# Patient Record
Sex: MALE | Race: BLACK OR AFRICAN AMERICAN | NOT HISPANIC OR LATINO | Employment: FULL TIME | URBAN - METROPOLITAN AREA
[De-identification: names, ages, dates, MRNs, and addresses within clinical notes are randomized per-mention and may not be internally consistent; named-entity substitution may affect disease eponyms.]

---

## 2019-06-06 ENCOUNTER — OFFICE VISIT (OUTPATIENT)
Dept: URGENT CARE | Facility: URGENT CARE | Age: 19
End: 2019-06-06
Payer: COMMERCIAL

## 2019-06-06 VITALS
OXYGEN SATURATION: 99 % | WEIGHT: 170.8 LBS | HEART RATE: 58 BPM | DIASTOLIC BLOOD PRESSURE: 78 MMHG | SYSTOLIC BLOOD PRESSURE: 134 MMHG | TEMPERATURE: 98.1 F

## 2019-06-06 DIAGNOSIS — K42.9 UMBILICAL HERNIA WITHOUT OBSTRUCTION AND WITHOUT GANGRENE: Primary | ICD-10-CM

## 2019-06-06 PROCEDURE — 99203 OFFICE O/P NEW LOW 30 MIN: CPT | Performed by: PHYSICIAN ASSISTANT

## 2019-06-06 ASSESSMENT — ENCOUNTER SYMPTOMS
ABDOMINAL PAIN: 1
SHORTNESS OF BREATH: 0
CHILLS: 0
CARDIOVASCULAR NEGATIVE: 1
DIZZINESS: 0
VOMITING: 0
MUSCULOSKELETAL NEGATIVE: 1
CONSTITUTIONAL NEGATIVE: 1
EYE REDNESS: 0
WEAKNESS: 0
WHEEZING: 0
EYE ITCHING: 0
FREQUENCY: 0
EYES NEGATIVE: 1
ENDOCRINE NEGATIVE: 1
HEMATURIA: 0
NAUSEA: 0
RESPIRATORY NEGATIVE: 1
CHEST TIGHTNESS: 0
PALPITATIONS: 0
SORE THROAT: 0
NEUROLOGICAL NEGATIVE: 1
ADENOPATHY: 0
HEADACHES: 0
POLYDIPSIA: 0
DYSURIA: 0
DIAPHORESIS: 0
MYALGIAS: 0
COUGH: 0
LIGHT-HEADEDNESS: 0
DIARRHEA: 0
FEVER: 0
RHINORRHEA: 0
EYE DISCHARGE: 0

## 2019-06-06 NOTE — PROGRESS NOTES
Chief Complaint:    Chief Complaint   Patient presents with     Abdominal Pain     Patient complains of abdominal pain x 3 weeks        HPI: Radha Garcia is an 18 year old male who presents for evaluation and treatment of abdominal pain.  Patient states that his symptoms started roughly 3 weeks ago.  His symptoms are around the umbilicus.  The pain is sharp in nature and does not radiate.  Movement seems to make the symptoms worse at times.  His symptoms come and go.  He states that he did have this when he was a child, and it resolved on it's own.  He denies any constipation or diarrhea.  He has been having normal bowel movements.  No nausea or vomiting.  No black tarry stools.    Patient is new to [place].    ROS:      Review of Systems   Constitutional: Negative.  Negative for chills, diaphoresis and fever.   HENT: Negative.  Negative for congestion, ear pain, rhinorrhea and sore throat.    Eyes: Negative.  Negative for discharge, redness and itching.   Respiratory: Negative.  Negative for cough, chest tightness, shortness of breath and wheezing.    Cardiovascular: Negative.  Negative for chest pain and palpitations.   Gastrointestinal: Positive for abdominal pain. Negative for diarrhea, nausea and vomiting.   Endocrine: Negative.  Negative for polydipsia and polyuria.   Genitourinary: Negative for dysuria, frequency, hematuria and urgency.   Musculoskeletal: Negative.  Negative for myalgias.   Skin: Negative for rash.   Allergic/Immunologic: Negative for immunocompromised state.   Neurological: Negative.  Negative for dizziness, weakness, light-headedness and headaches.   Hematological: Negative for adenopathy.        No pertinent family or medical Hx at this time.  Patient has never smoked.  No pertinent surgical Hx at this time.    Family History   No family history on file.    Social History  Social History     Socioeconomic History     Marital status: Single     Spouse name: Not on file     Number of  children: Not on file     Years of education: Not on file     Highest education level: Not on file   Occupational History     Not on file   Social Needs     Financial resource strain: Not on file     Food insecurity:     Worry: Not on file     Inability: Not on file     Transportation needs:     Medical: Not on file     Non-medical: Not on file   Tobacco Use     Smoking status: Never Smoker     Smokeless tobacco: Never Used   Substance and Sexual Activity     Alcohol use: Not on file     Drug use: Not on file     Sexual activity: Not on file   Lifestyle     Physical activity:     Days per week: Not on file     Minutes per session: Not on file     Stress: Not on file   Relationships     Social connections:     Talks on phone: Not on file     Gets together: Not on file     Attends Mandaeism service: Not on file     Active member of club or organization: Not on file     Attends meetings of clubs or organizations: Not on file     Relationship status: Not on file     Intimate partner violence:     Fear of current or ex partner: Not on file     Emotionally abused: Not on file     Physically abused: Not on file     Forced sexual activity: Not on file   Other Topics Concern     Not on file   Social History Narrative     Not on file        Surgical History:  No past surgical history on file.     Problem List:  There is no problem list on file for this patient.       Allergies:  No Known Allergies     Current Meds:  No current outpatient medications on file.     PHYSICAL EXAM:     Vital signs noted and reviewed by Lico Dooley  /78 (BP Location: Left arm, Patient Position: Chair, Cuff Size: Adult Regular)   Pulse 58   Temp 98.1  F (36.7  C) (Oral)   Wt 77.5 kg (170 lb 12.8 oz)   SpO2 99%      PEFR:    Physical Exam   Constitutional: He appears well-developed and well-nourished. He is cooperative.  Non-toxic appearance. He does not have a sickly appearance. He does not appear ill. No distress.   HENT:   Head:  Normocephalic and atraumatic.   Right Ear: Hearing, tympanic membrane, external ear and ear canal normal. Tympanic membrane is not perforated, not erythematous, not retracted and not bulging.   Left Ear: Hearing, tympanic membrane, external ear and ear canal normal. Tympanic membrane is not perforated, not erythematous, not retracted and not bulging.   Nose: Nose normal. No mucosal edema or rhinorrhea.   Mouth/Throat: Oropharynx is clear and moist and mucous membranes are normal. No oropharyngeal exudate, posterior oropharyngeal edema, posterior oropharyngeal erythema or tonsillar abscesses. Tonsils are 0 on the right. Tonsils are 0 on the left. No tonsillar exudate.   Eyes: Pupils are equal, round, and reactive to light. EOM are normal.   Neck: Normal range of motion. Neck supple.   Cardiovascular: Normal rate, regular rhythm, S1 normal, S2 normal, normal heart sounds and intact distal pulses. Exam reveals no gallop, no distant heart sounds and no friction rub.   No murmur heard.  Pulmonary/Chest: Effort normal and breath sounds normal. No respiratory distress. He has no decreased breath sounds. He has no wheezes. He has no rhonchi. He has no rales.   Abdominal: Soft. Bowel sounds are normal. He exhibits no distension. There is tenderness in the periumbilical area. There is no rigidity, no rebound, no guarding, no CVA tenderness, no tenderness at McBurney's point and negative Santana's sign. A hernia is present. Hernia confirmed positive in the ventral area.   Small hernia around umbilicus   Lymphadenopathy:     He has no cervical adenopathy.   Neurological: He is alert. No cranial nerve deficit.   Skin: Skin is warm and dry. No rash noted. He is not diaphoretic.   Psychiatric: He has a normal mood and affect. His speech is normal and behavior is normal. Judgment and thought content normal. Cognition and memory are normal. He is attentive.   Nursing note and vitals reviewed.       Labs:     No results found for this  or any previous visit.    Medical Decision Making:    Differential Diagnosis:  hernia     ASSESSMENT:     1. Umbilical hernia without obstruction and without gangrene         PLAN:     Patient is currently asymptomatic.  No indication of strangulation.  Emergent imaging to rule this or obstruction out is not indicated.  Order place for him to follow up with surgeon.  Worrisome symptoms discussed with instructions to go to the ED.  Patient verbalized understanding and agreed with this plan.     Lico Dooley  6/6/2019, 11:32 AM

## 2019-06-10 ENCOUNTER — ANCILLARY PROCEDURE (OUTPATIENT)
Dept: GENERAL RADIOLOGY | Facility: CLINIC | Age: 19
End: 2019-06-10
Attending: INTERNAL MEDICINE
Payer: OTHER MISCELLANEOUS

## 2019-06-10 ENCOUNTER — OFFICE VISIT (OUTPATIENT)
Dept: FAMILY MEDICINE | Facility: CLINIC | Age: 19
End: 2019-06-10
Payer: OTHER MISCELLANEOUS

## 2019-06-10 VITALS
HEIGHT: 70 IN | HEART RATE: 56 BPM | DIASTOLIC BLOOD PRESSURE: 83 MMHG | TEMPERATURE: 98.5 F | SYSTOLIC BLOOD PRESSURE: 133 MMHG | OXYGEN SATURATION: 100 % | BODY MASS INDEX: 24.91 KG/M2 | WEIGHT: 174 LBS

## 2019-06-10 DIAGNOSIS — Z02.6 ENCOUNTER RELATED TO WORKER'S COMPENSATION CLAIM: Primary | ICD-10-CM

## 2019-06-10 DIAGNOSIS — M62.838 MUSCLE SPASM: ICD-10-CM

## 2019-06-10 DIAGNOSIS — R07.81 RIB PAIN ON LEFT SIDE: ICD-10-CM

## 2019-06-10 PROBLEM — T14.8XXA MUSCLE TEAR: Status: ACTIVE | Noted: 2019-06-10

## 2019-06-10 PROCEDURE — 99214 OFFICE O/P EST MOD 30 MIN: CPT | Performed by: INTERNAL MEDICINE

## 2019-06-10 PROCEDURE — 71101 X-RAY EXAM UNILAT RIBS/CHEST: CPT | Mod: LT

## 2019-06-10 RX ORDER — METHOCARBAMOL 500 MG/1
500 TABLET, FILM COATED ORAL 4 TIMES DAILY PRN
Qty: 30 TABLET | Refills: 1 | Status: SHIPPED | OUTPATIENT
Start: 2019-06-10

## 2019-06-10 RX ORDER — IBUPROFEN 600 MG/1
600 TABLET, FILM COATED ORAL
COMMUNITY
Start: 2019-06-10

## 2019-06-10 RX ORDER — ACETAMINOPHEN 325 MG/1
650 TABLET ORAL
COMMUNITY
Start: 2019-06-10

## 2019-06-10 ASSESSMENT — MIFFLIN-ST. JEOR: SCORE: 1805.51

## 2019-06-10 ASSESSMENT — PAIN SCALES - GENERAL: PAINLEVEL: SEVERE PAIN (7)

## 2019-06-10 NOTE — PATIENT INSTRUCTIONS
================================================================================  Normal Values   Blood pressure  <140/90 for most adults    <130/80 for some chronic diseases (ask your care team about yours)    BMI (body mass index)  18.5-25 kg/m2 (based on height and weight)     Thank you for visiting [facility]    Normal or non-critical lab and imaging results will be communicated to you by MyChart, letter or phone within 7 days.  If you do not hear from us within 10 days, please call the clinic. If you have a critical or abnormal lab result, we will notify you by phone as soon as possible.     If you have any questions regarding your visit please contact:     Team Comfort:   Clinic Hours Telephone Number   Dr. Rylan Landry Dr. [place] 7am-5pm  Monday - Friday [phone]  Lorie RN  Ophelia RN  Suad RN   Pharmacy 8:00am-8pm Monday-Friday    9am-5pm Saturday-Sunday [phone]   Urgent Care 11am-9pm Monday-Friday        9am-5pm Saturday-Sunday [phone]     After hours, weekend or if you need to make an appointment with your primary provider please call [phone].   After Hours nurse advise: call [facility]: [phone]    Medication Refills:  Call your pharmacy and they will forward the refill to us. Please allow 3 business days for your refills to be completed.

## 2019-06-10 NOTE — PROGRESS NOTES
"Lois Garcia is a 18 year old male who presents to clinic today for the following health issues:    HPI   Joint Pain    Onset: 6/9/19    Description: Trying to stack two 10 pound stacks of bun.   Location: Left side ribs  Character: Dull Ache    Intensity: 7/10    Progression of Symptoms: same    Accompanying Signs & Symptoms:  Other symptoms: radiation of pain to back    History:   Previous similar pain: no       Precipitating factors:   Trauma or overuse: YES- work    Alleviating factors:Spread to low back area, but improving. When doing sit-ups, abdominal pain recurs (\"scraping pain\"). Worse when walking.  Improved by: nothing  Therapies Tried and outcome: SEEN AT [place] 6/9/19 GIVEN IBUPROFEN & TYLENOL. No tests done.          Patient Active Problem List   Diagnosis     Rib pain on left side     Muscle tear     No past surgical history on file.    Social History     Tobacco Use     Smoking status: Never Smoker     Smokeless tobacco: Never Used   Substance Use Topics     Alcohol use: Not on file     No family history on file.      No Known Allergies  No lab results found.   BP Readings from Last 3 Encounters:   06/12/19 131/80   06/10/19 133/83   06/06/19 134/78    Wt Readings from Last 3 Encounters:   06/12/19 77.6 kg (171 lb)   06/10/19 78.9 kg (174 lb)   06/06/19 77.5 kg (170 lb 12.8 oz)                      Reviewed and updated as needed this visit by Provider         Review of Systems   ROS COMP: CONSTITUTIONAL: NEGATIVE for fever, chills, change in weight  INTEGUMENTARY/SKIN: NEGATIVE for worrisome rashes, moles or lesions  EYES: NEGATIVE for vision changes or irritation  ENT/MOUTH: NEGATIVE for ear, mouth and throat problems  RESP: NEGATIVE for significant cough or SOB  CV: NEGATIVE for chest pain, palpitations or peripheral edema  GI: NEGATIVE for nausea, abdominal pain, heartburn, or change in bowel habits  : NEGATIVE for frequency, dysuria, or hematuria  MUSCULOSKELETAL: NEGATIVE " "for significant arthralgias or myalgia  NEURO: NEGATIVE for weakness, dizziness or paresthesias  ENDOCRINE: NEGATIVE for temperature intolerance, skin/hair changes  HEME: NEGATIVE for bleeding problems  PSYCHIATRIC: NEGATIVE for changes in mood or affect      Objective    /83 (BP Location: Left arm, Patient Position: Chair, Cuff Size: Adult Large)   Pulse 56   Temp 98.5  F (36.9  C) (Oral)   Ht 1.778 m (5' 10\")   Wt 78.9 kg (174 lb)   SpO2 100%   BMI 24.97 kg/m    Body mass index is 24.97 kg/m .  Physical Exam   GENERAL: healthy, alert and no distress  EYES: Eyes grossly normal to inspection and conjunctivae and sclerae normal  HENT: normal cephalic/atraumatic and oral mucous membranes moist  RESP: lungs clear to auscultation - no rales, rhonchi or wheezes  CV: regular rate and rhythm, normal S1 S2, no S3 or S4, no murmur,   ABDOMEN: soft, nontender, no hepatosplenomegaly, no masses and bowel sounds normal  MS: Tenderness on palpation of the left subcostal area and abdominal muscles at LUQ and periumbilical area.  SKIN: no suspicious lesions or rashes  NEURO: Normal strength and tone, mentation intact and speech normal  PSYCH: mentation appears normal, affect normal/bright    Diagnostic Test Results:  Results for orders placed or performed in visit on 06/10/19   XR Ribs & Chest Left G/E 3 Views    Narrative    CHEST AND LEFT RIBS THREE VIEWS   6/10/2019 1:40 PM     HISTORY: Encounter related to worker's compensation claim. Rib pain on  left side.    COMPARISON: None.      Impression    IMPRESSION: Normal.      NIDHI NUÑEZ MD           Assessment & Plan     1. Encounter related to worker's compensation claim    - XR Ribs & Chest Left G/E 3 Views  - methocarbamol (ROBAXIN) 500 MG tablet; Take 1 tablet (500 mg) by mouth 4 times daily as needed for muscle spasms  Dispense: 30 tablet; Refill: 1  - CT Abdomen Pelvis w/o Contrast; Future    2. Rib pain on left side    - acetaminophen (TYLENOL) 325 MG tablet; " Take 650 mg by mouth  - ibuprofen (ADVIL/MOTRIN) 600 MG tablet; Take 600 mg by mouth  - XR Ribs & Chest Left G/E 3 Views  - methocarbamol (ROBAXIN) 500 MG tablet; Take 1 tablet (500 mg) by mouth 4 times daily as needed for muscle spasms  Dispense: 30 tablet; Refill: 1  - CT Abdomen Pelvis w/o Contrast; Future    3. Muscle spasm    - methocarbamol (ROBAXIN) 500 MG tablet; Take 1 tablet (500 mg) by mouth 4 times daily as needed for muscle spasms  Dispense: 30 tablet; Refill: 1       Return in 2 days (on 6/12/2019).    Ramirez Obrien MD  [facility]

## 2019-06-10 NOTE — LETTER
REPORT OF WORK ABILITY    [address] [address]  [phone]      PATIENT DATA    Employee Name: Radha Garcia      : 2000     #: [id number]    Today's date: 6/10/2019  Date of injury: 6/10/2019  Date of first visit: 6/10/2019    PROVIDER EVALUATION: Please fill in as needed.  Please give copy to employee for employer.    1. Diagnosis: Left rib pain, subsequent encounter                        Periumbilical pain, suspect abdominal wall muscle tear.    2. Treatment: Rest and pharmacotherapy.  3. Medication: Methocarbamol, Ibuprofen and Tylenol.  NOTE: When ordering a medication, [place] Rules require Work Comp or WC on prescriptions.    4. No work from 6/10/2019 to 2019.  5. Return to work date: 2019, tentative date.   ** WITH RESTRICTIONS? No restrictions, but it will be updated during his follow-up appointment.      RESTRICTIONS: Unlimited unless listed.  Restrictions apply to home and leisure also.  If work restrictions is not available, the employee is totally disabled.    Maximum Medical Improvement (Date): Cannot be determined yet  Any Permanent Partial Disability? 0%    Provider comments: Mr. Garcia requires rib x-rays due to persistent left subcostal pain and CT of abdomen due to suspected abdominal muscle tear.    Medical Examiner: Ramirez Obrien MD          License or registration: [id number]    Next appointment: 2019.    CC: Employer, Managed Care Plan/Payor, Patient

## 2019-06-11 ENCOUNTER — ANCILLARY PROCEDURE (OUTPATIENT)
Dept: CT IMAGING | Facility: CLINIC | Age: 19
End: 2019-06-11
Attending: INTERNAL MEDICINE
Payer: OTHER MISCELLANEOUS

## 2019-06-11 DIAGNOSIS — T14.8XXA MUSCLE TEAR: ICD-10-CM

## 2019-06-11 DIAGNOSIS — Z02.6 ENCOUNTER RELATED TO WORKER'S COMPENSATION CLAIM: ICD-10-CM

## 2019-06-11 PROCEDURE — 74176 CT ABD & PELVIS W/O CONTRAST: CPT | Performed by: RADIOLOGY

## 2019-06-12 ENCOUNTER — TELEPHONE (OUTPATIENT)
Dept: FAMILY MEDICINE | Facility: CLINIC | Age: 19
End: 2019-06-12

## 2019-06-12 ENCOUNTER — OFFICE VISIT (OUTPATIENT)
Dept: FAMILY MEDICINE | Facility: CLINIC | Age: 19
End: 2019-06-12
Payer: OTHER MISCELLANEOUS

## 2019-06-12 VITALS
DIASTOLIC BLOOD PRESSURE: 80 MMHG | WEIGHT: 171 LBS | RESPIRATION RATE: 16 BRPM | HEIGHT: 70 IN | HEART RATE: 63 BPM | BODY MASS INDEX: 24.48 KG/M2 | TEMPERATURE: 98.5 F | SYSTOLIC BLOOD PRESSURE: 131 MMHG | OXYGEN SATURATION: 100 %

## 2019-06-12 DIAGNOSIS — R07.81 RIB PAIN ON LEFT SIDE: ICD-10-CM

## 2019-06-12 DIAGNOSIS — S39.011S: ICD-10-CM

## 2019-06-12 DIAGNOSIS — Z02.6 ENCOUNTER RELATED TO WORKER'S COMPENSATION CLAIM: Primary | ICD-10-CM

## 2019-06-12 PROCEDURE — 99213 OFFICE O/P EST LOW 20 MIN: CPT | Performed by: INTERNAL MEDICINE

## 2019-06-12 ASSESSMENT — MIFFLIN-ST. JEOR: SCORE: 1791.9

## 2019-06-12 ASSESSMENT — PAIN SCALES - GENERAL: PAINLEVEL: MODERATE PAIN (4)

## 2019-06-12 NOTE — TELEPHONE ENCOUNTER
----- Message from Ramirez Obrien MD sent at 6/11/2019  9:19 AM CDT -----  Despite left subcostal pain sustained at work, x-rays show no evidence of rib fractures.  Continue Ibuprofen and Methocarbamol.    (Call patient)      This writer attempted to contact patient on 06/12/19      Reason for call result notes and left message.      If patient calls back:   Patient contacted by 1st floor [place] Care Team (MA/TC). Inform patient that someone from the team will contact them, document that pt called and route to care team.         Tena Ceron MA

## 2019-06-12 NOTE — PROGRESS NOTES
Subjective     Radha Garcia is a 18 year old male who presents to clinic today for the following health issues:    HPI   Worker's compensation follow-up  Onset: 06/10/2019    Description:   Patient had a work related injury to left side of rib with radiation of pain to back.     Intensity: 4/10    Progression of Symptoms:  improving    Accompanying Signs & Symptoms:  Radiation to back     Previous history of similar problem:   no    Precipitating factors:   Worsened by: walking     Alleviating factors:  Improved by: ibuprofen and Tylenol    Therapies Tried and outcome: ibuprofen and methocarbamol         Patient Active Problem List   Diagnosis     Rib pain on left side     Muscle tear     History reviewed. No pertinent surgical history.    Social History     Tobacco Use     Smoking status: Never Smoker     Smokeless tobacco: Never Used   Substance Use Topics     Alcohol use: Not on file     History reviewed. No pertinent family history.      Current Outpatient Medications   Medication Sig Dispense Refill     acetaminophen (TYLENOL) 325 MG tablet Take 650 mg by mouth       ibuprofen (ADVIL/MOTRIN) 600 MG tablet Take 600 mg by mouth       methocarbamol (ROBAXIN) 500 MG tablet Take 1 tablet (500 mg) by mouth 4 times daily as needed for muscle spasms 30 tablet 1     No Known Allergies  No lab results found.   BP Readings from Last 3 Encounters:   06/12/19 131/80   06/10/19 133/83   06/06/19 134/78    Wt Readings from Last 3 Encounters:   06/12/19 77.6 kg (171 lb)   06/10/19 78.9 kg (174 lb)   06/06/19 77.5 kg (170 lb 12.8 oz)                      Reviewed and updated as needed this visit by Provider         Review of Systems   ROS COMP: CONSTITUTIONAL: NEGATIVE for fever, chills, change in weight  INTEGUMENTARY/SKIN: NEGATIVE for worrisome rashes, moles or lesions  EYES: NEGATIVE for vision changes or irritation  ENT/MOUTH: NEGATIVE for ear, mouth and throat problems  RESP: NEGATIVE for significant cough or  "SOB  CV: NEGATIVE for chest pain, palpitations or peripheral edema  GI: NEGATIVE for nausea, abdominal pain, heartburn, or change in bowel habits  : NEGATIVE for frequency, dysuria, or hematuria  MUSCULOSKELETAL:As above.  NEURO: NEGATIVE for weakness, dizziness or paresthesias  ENDOCRINE: NEGATIVE for temperature intolerance, skin/hair changes  HEME: NEGATIVE for bleeding problems  PSYCHIATRIC: NEGATIVE for changes in mood or affect      Objective    /80 (BP Location: Left arm, Patient Position: Chair, Cuff Size: Adult Regular)   Pulse 63   Temp 98.5  F (36.9  C) (Oral)   Resp 16   Ht 1.778 m (5' 10\")   Wt 77.6 kg (171 lb)   SpO2 100%   BMI 24.54 kg/m    Physical Exam   GENERAL: healthy, alert and no distress  EYES: Eyes grossly normal to inspection  HENT: normal cephalic/atraumatic, oropharynx clear and oral mucous membranes moist  RESP: lungs clear to auscultation - no rales, rhonchi or wheezes  ABDOMEN: soft, nontender, without hepatosplenomegaly or masses and bowel sounds normal  MS: No tenderness on palpation of left subcostal and periumbilical area.  NEURO: Normal strength and tone, mentation intact and speech normal  PSYCH: mentation appears normal, affect normal/bright    Diagnostic Test Results:  Results for orders placed or performed in visit on 06/11/19   CT Abdomen Pelvis w/o Contrast    Narrative    EXAMINATION: CT ABDOMEN PELVIS W/O CONTRAST, 6/11/2019 1:34 PM    TECHNIQUE:  Helical CT images from the lung bases through the  symphysis pubis were obtained without contrast.  Coronal and sagittal  reformatted images were generated at a workstation for further  assessment.    COMPARISON: None.    HISTORY: Suspect abdominal muscle tear.; Muscle tear; Encounter  related to worker's compensation claim. Pain in left ribs, extending  to back.    FINDINGS:  Suboptimal evaluation give the lack of intravenous contrast.     Normal musculature of the lower chest wall and abdomen.    Lung bases are " clear. Heart size is within normal limits. Noncontrast  appearance of the liver, gallbladder, spleen, pancreas, adrenal  glands, kidneys are unremarkable. Urinary bladder is partially  distended but otherwise unremarkable. Normal size of the prostate.  Normal colonic caliber. Appendix is unremarkable. No infrarenal aortic  aneurysm. No abdominal lymphadenopathy by size criteria.    No suspicious bony lesions.      Impression    IMPRESSION:   Normal musculature of the lower chest wall and abdomen on this  noncontrast examination.    I have personally reviewed the examination and initial interpretation  and I agree with the findings.    ARANZA GU MD           Assessment & Plan     1. Encounter related to worker's compensation claim  Comment: Return to work tomorrow without any contraindications.    2. Abdominal muscle strain, sequela  Comment: CT of abdomen/pelvis shows no evidence of abdominal hematoma nor muscle tear.    3. Rib pain on left side  Comment: Rib x-rays show no fractures.              - Follow-up for annual visit or as needed    Ramirez Obrien MD  [facility]

## 2019-06-12 NOTE — PATIENT INSTRUCTIONS
At [facility], we strive to deliver an exceptional experience to you, every time we see you.  If you receive a survey in the mail, please send us back your thoughts. We really do value your feedback.    Based on your medical history, these are the current health maintenance/preventive care services that you are due for (some may have been done at this visit.)  Health Maintenance Due   Topic Date Due     PREVENTIVE CARE VISIT  1998     DTAP/TDAP/TD IMMUNIZATION (5 - Tdap) 08/13/2010     HIV SCREENING  12/07/2013         Suggested websites for health information:  Www.FamilySpace.RU.CADsurf : Up to date and easily searchable information on multiple topics.  Www.medlineplus.gov : medication info, interactive tutorials, watch real surgeries online  Www.familydoctor.org : good info from the Academy of Family Physicians  Www.cdc.gov : public health info, travel advisories, epidemics (H1N1)  Www.aap.org : children's health info, normal development, vaccinations  Www.health.[facility].[place].us : [place] dept of health, public health issues in [place], N1N1    Your care team:                            Family Medicine Internal Medicine   MD Ramirez Cabrera MD Shantel Branch-Fleming, MD Katya Georgiev PA-C Nam Ho, MD Pediatrics   LEONIE Juan, MD Joana Pettit CNP, MD Deborah Mielke, MD Kim Thein, APRN CNP      Clinic hours: Monday - Thursday 7 am-7 pm; Fridays 7 am-5 pm.   Urgent care: Monday - Friday 11 am-9 pm; Saturday and Sunday 9 am-5 pm.  Pharmacy : Monday -Thursday 8 am-8 pm; Friday 8 am-6 pm; Saturday and Sunday 9 am-5 pm.     Clinic: [phone]   Pharmacy: [phone]

## 2019-06-12 NOTE — LETTER
REPORT OF WORK ABILITY     [address] [address]  [phone]        PATIENT DATA     Employee Name: Radha Garcia      : 2000     #: [id number]     Today's date: 2019  Date of injury: 6/10/2019        PROVIDER EVALUATION: Please fill in as needed.  Please give copy to employee for employer.     1. Diagnosis: Left rib pain, sequelae                        Periumbilical pain secondary to muscle spasm.     2. Treatment: Rest and pharmacotherapy.  3. Medication: Methocarbamol, Ibuprofen and Tylenol.  NOTE: When ordering a medication, [place] Rules require [place] Comp or WC on prescriptions.     4. No work from 6/10/2019 to 2019.  5. Return to work date: 2019** WITH RESTRICTIONS? None      RESTRICTIONS: Unlimited unless listed.  Restrictions apply to home and leisure also.  If work restrictions is not available, the employee is totally disabled.     Maximum Medical Improvement (Date): 2019  Any Permanent Partial Disability? 0%     Provider comments: His condition improved remarkably after taking muscle relaxants. X-rays of ribs and chest are unremarkable. CT of abdomen is also normal. Mr. Garcia will return to work on 2019     Medical Examiner: Ramirez Obrien MD          License or registration: [place] [id number]     Next appointment: As needed.     CC: Employer, Managed Care Plan/Payor, Patient

## 2019-11-04 ENCOUNTER — OFFICE VISIT (OUTPATIENT)
Dept: URGENT CARE | Facility: URGENT CARE | Age: 19
End: 2019-11-04
Payer: COMMERCIAL

## 2019-11-04 VITALS
WEIGHT: 167.6 LBS | DIASTOLIC BLOOD PRESSURE: 77 MMHG | TEMPERATURE: 98.7 F | BODY MASS INDEX: 24.05 KG/M2 | OXYGEN SATURATION: 97 % | HEART RATE: 62 BPM | SYSTOLIC BLOOD PRESSURE: 119 MMHG | RESPIRATION RATE: 12 BRPM

## 2019-11-04 DIAGNOSIS — J06.9 VIRAL UPPER RESPIRATORY TRACT INFECTION: Primary | ICD-10-CM

## 2019-11-04 DIAGNOSIS — R07.0 THROAT PAIN: ICD-10-CM

## 2019-11-04 LAB
DEPRECATED S PYO AG THROAT QL EIA: NORMAL
SPECIMEN SOURCE: NORMAL

## 2019-11-04 PROCEDURE — 99214 OFFICE O/P EST MOD 30 MIN: CPT | Performed by: PHYSICIAN ASSISTANT

## 2019-11-04 PROCEDURE — 87081 CULTURE SCREEN ONLY: CPT | Performed by: PHYSICIAN ASSISTANT

## 2019-11-04 PROCEDURE — 87880 STREP A ASSAY W/OPTIC: CPT | Performed by: PHYSICIAN ASSISTANT

## 2019-11-04 ASSESSMENT — ENCOUNTER SYMPTOMS
FREQUENCY: 0
NEUROLOGICAL NEGATIVE: 1
MUSCULOSKELETAL NEGATIVE: 1
MYALGIAS: 0
EYES NEGATIVE: 1
EYE ITCHING: 0
WHEEZING: 0
FEVER: 0
GASTROINTESTINAL NEGATIVE: 1
PALPITATIONS: 0
EYE REDNESS: 0
WEAKNESS: 0
DIAPHORESIS: 0
HEMATURIA: 0
ABDOMINAL PAIN: 0
CHEST TIGHTNESS: 0
VOMITING: 0
ADENOPATHY: 0
CONSTITUTIONAL NEGATIVE: 1
HEADACHES: 0
COUGH: 1
ENDOCRINE NEGATIVE: 1
POLYDIPSIA: 0
CHILLS: 0
NAUSEA: 0
SORE THROAT: 1
RHINORRHEA: 0
DIARRHEA: 0
DIZZINESS: 0
SHORTNESS OF BREATH: 0
DYSURIA: 0
LIGHT-HEADEDNESS: 0
EYE DISCHARGE: 0
CARDIOVASCULAR NEGATIVE: 1

## 2019-11-04 NOTE — PROGRESS NOTES
Chief Complaint:     Chief Complaint   Patient presents with     Pharyngitis     x a couple days     Headache       HPI: Radha Garcia is an 18 year old male who presents with chest congestion, cough nonproductive, occasional, headache, nasal congestion and sore throat. Symptoms began 2  days ago and has unchanged.  There is no shortness of breath, wheezing and chest pain.      Recent travel?  no.      ROS:     Review of Systems   Constitutional: Negative.  Negative for chills, diaphoresis and fever.   HENT: Positive for congestion and sore throat. Negative for ear pain and rhinorrhea.    Eyes: Negative.  Negative for discharge, redness and itching.   Respiratory: Positive for cough. Negative for chest tightness, shortness of breath and wheezing.    Cardiovascular: Negative.  Negative for chest pain and palpitations.   Gastrointestinal: Negative.  Negative for abdominal pain, diarrhea, nausea and vomiting.   Endocrine: Negative.  Negative for polydipsia and polyuria.   Genitourinary: Negative for dysuria, frequency, hematuria and urgency.   Musculoskeletal: Negative.  Negative for myalgias.   Skin: Negative for rash.   Allergic/Immunologic: Negative for immunocompromised state.   Neurological: Negative.  Negative for dizziness, weakness, light-headedness and headaches.   Hematological: Negative for adenopathy.        Respiratory History  occasional episodes of bronchitis       Family History   History reviewed. No pertinent family history.     Problem history  Patient Active Problem List   Diagnosis     Rib pain on left side     Muscle tear        Allergies  No Known Allergies     Social History  Social History     Socioeconomic History     Marital status: Single     Spouse name: Not on file     Number of children: Not on file     Years of education: Not on file     Highest education level: Not on file   Occupational History     Not on file   Social Needs     Financial resource strain: Not on file     Food  insecurity:     Worry: Not on file     Inability: Not on file     Transportation needs:     Medical: Not on file     Non-medical: Not on file   Tobacco Use     Smoking status: Never Smoker     Smokeless tobacco: Never Used   Substance and Sexual Activity     Alcohol use: Not on file     Drug use: Not on file     Sexual activity: Not on file   Lifestyle     Physical activity:     Days per week: Not on file     Minutes per session: Not on file     Stress: Not on file   Relationships     Social connections:     Talks on phone: Not on file     Gets together: Not on file     Attends Evangelical service: Not on file     Active member of club or organization: Not on file     Attends meetings of clubs or organizations: Not on file     Relationship status: Not on file     Intimate partner violence:     Fear of current or ex partner: Not on file     Emotionally abused: Not on file     Physically abused: Not on file     Forced sexual activity: Not on file   Other Topics Concern     Not on file   Social History Narrative     Not on file        Current Meds    Current Outpatient Medications:      acetaminophen (TYLENOL) 325 MG tablet, Take 650 mg by mouth, Disp: , Rfl:      ibuprofen (ADVIL/MOTRIN) 600 MG tablet, Take 600 mg by mouth, Disp: , Rfl:      methocarbamol (ROBAXIN) 500 MG tablet, Take 1 tablet (500 mg) by mouth 4 times daily as needed for muscle spasms (Patient not taking: Reported on 11/4/2019), Disp: 30 tablet, Rfl: 1        OBJECTIVE     Vital signs reviewed by Lico Dooley PA-C  /77   Pulse 62   Temp 98.7  F (37.1  C) (Oral)   Resp 12   Wt 76 kg (167 lb 9.6 oz)   SpO2 97%   BMI 24.05 kg/m       Physical Exam  Vitals signs and nursing note reviewed.   Constitutional:       General: He is not in acute distress.     Appearance: He is well-developed. He is not ill-appearing, toxic-appearing or diaphoretic.   HENT:      Head: Normocephalic and atraumatic.      Right Ear: Hearing, tympanic membrane, ear  canal and external ear normal. No drainage, swelling or tenderness. Tympanic membrane is not perforated, erythematous, retracted or bulging.      Left Ear: Hearing, tympanic membrane, ear canal and external ear normal. No drainage, swelling or tenderness. Tympanic membrane is not perforated, erythematous, retracted or bulging.      Nose: Nose normal. No nasal tenderness, mucosal edema, congestion or rhinorrhea.      Right Turbinates: Not enlarged or swollen.      Left Turbinates: Not enlarged or swollen.      Right Sinus: No maxillary sinus tenderness or frontal sinus tenderness.      Left Sinus: No maxillary sinus tenderness or frontal sinus tenderness.      Mouth/Throat:      Pharynx: Posterior oropharyngeal erythema present. No pharyngeal swelling, oropharyngeal exudate or uvula swelling.      Tonsils: No tonsillar exudate or tonsillar abscesses. Swellin on the right. 0 on the left.   Eyes:      General: Lids are normal.         Right eye: No discharge.         Left eye: No discharge.      Conjunctiva/sclera: Conjunctivae normal.      Right eye: Right conjunctiva is not injected. No exudate.     Left eye: Left conjunctiva is not injected. No exudate.     Pupils: Pupils are equal, round, and reactive to light.   Neck:      Musculoskeletal: Normal range of motion and neck supple.   Cardiovascular:      Rate and Rhythm: Normal rate and regular rhythm.      Heart sounds: Normal heart sounds, S1 normal and S2 normal. Heart sounds not distant. No murmur. No friction rub. No gallop.    Pulmonary:      Effort: Pulmonary effort is normal. No accessory muscle usage, respiratory distress or retractions.      Breath sounds: Normal breath sounds and air entry. No stridor, decreased air movement or transmitted upper airway sounds. No decreased breath sounds, wheezing, rhonchi or rales.   Chest:      Chest wall: No tenderness.   Abdominal:      General: Bowel sounds are normal. There is no distension.      Palpations:  Abdomen is soft. Abdomen is not rigid. There is no mass.      Tenderness: There is no tenderness. There is no guarding or rebound.   Musculoskeletal: Normal range of motion.   Lymphadenopathy:      Head:      Right side of head: No submental, submandibular, tonsillar, preauricular or posterior auricular adenopathy.      Left side of head: No submental, submandibular, tonsillar, preauricular or posterior auricular adenopathy.      Cervical: No cervical adenopathy.      Right cervical: No superficial or posterior cervical adenopathy.     Left cervical: No superficial or posterior cervical adenopathy.   Skin:     General: Skin is warm and dry.      Capillary Refill: Capillary refill takes less than 2 seconds.      Findings: No rash.   Neurological:      Mental Status: He is alert and oriented to person, place, and time.      Cranial Nerves: No cranial nerve deficit.      Sensory: No sensory deficit.      Motor: No abnormal muscle tone.      Coordination: Coordination normal.      Deep Tendon Reflexes: Reflexes normal.   Psychiatric:         Attention and Perception: He is attentive.         Speech: Speech normal.         Behavior: Behavior normal. Behavior is cooperative.         Thought Content: Thought content normal.         Judgement: Judgment normal.           Labs:     Results for orders placed or performed in visit on 11/04/19   Strep, Rapid Screen     Status: None   Result Value Ref Range    Specimen Description Throat     Rapid Strep A Screen       NEGATIVE: No Group A streptococcal antigen detected by immunoassay, await culture report.       Medical Decision Making:    Differential Diagnosis:  URI Adult/Peds:  Bronchitis-viral, Influenza, Pneumonia, Strep pharyngitis, Tonsilitis, Viral pharyngitis, Viral syndrome and Viral upper respiratory illness        ASSESSMENT    1. Viral upper respiratory tract infection    2. Throat pain        PLAN    Patient presents with 2 day(s) chest congestion, cough  nonproductive, occasional, headache, nasal congestion and sore throat.    Patient is in no acute distress.    Temp is 98.7 in clinic today, lung sounds were clear, and O2 sats at 97% on RA.  Imaging to rule out pneumonia is not indicated at this time.  RST was negative.  We will call with culture results only if positive.  Rest, Push fluids, vaporizer, elevation of head of bed.  Ibuprofen and or Tylenol for any fever or body aches.  Over the counter cough suppressant- PRN- as discussed.   If symptoms worsen, recheck immediately otherwise follow up with your PCP in 1 week if symptoms are not improving.  Worrisome symptoms discussed with instructions to go to the ED.  Patient verbalized understanding and agreed with this plan.         Lico Dooley PA-C  11/4/2019, 5:24 PM

## 2019-11-04 NOTE — LETTER
[facility]  [address]  [facility] [address]          November 4, 2019    RE:  Radha Garcia                                                                                                                                                       [address]   [facility] [address]            To whom it may concern:    Radha Garcia is under my professional care for    Viral upper respiratory tract infection  Throat pain He  may return to work with no restrictions his next available shift.    Sincerely,        Lico Dooley PA-C

## 2019-11-04 NOTE — PATIENT INSTRUCTIONS

## 2019-11-05 ENCOUNTER — TELEPHONE (OUTPATIENT)
Dept: URGENT CARE | Facility: URGENT CARE | Age: 19
End: 2019-11-05

## 2019-11-05 LAB
BACTERIA SPEC CULT: NORMAL
SPECIMEN SOURCE: NORMAL

## 2019-11-05 NOTE — LETTER
November 12, 2019      Radha Garcia  [address]   [place] [address]        Dear ,    We are attempted to contact you many times with no success. This is to inform you that there is a letter waiting for you at [facility] per your requested.     You can come pick it up when you available. If you already  please disregard this message.    If you have any questions or concerns, please call the clinic at the number listed above.           Sincerely,    Lico Dooley PA-C

## 2019-11-05 NOTE — TELEPHONE ENCOUNTER
Reason for Call:  Other     Detailed comments: Work note needs sun monday added so he can get paid. lOaf call back and advise.    Phone Number Patient can be reached at: Cell number on file:    Telephone Information:   Mobile [phone]     Best Time: any    Can we leave a detailed message on this number? YES    Call taken on 11/5/2019 at 5:10 PM by Ce Wiley

## 2019-11-05 NOTE — TELEPHONE ENCOUNTER
Routing to provider to review and advise.   Ophelia Arias RN  [facility]         6

## 2019-11-06 NOTE — TELEPHONE ENCOUNTER
Please inform him that I have printed another letter.  If he does not return to work tomorrow, he will need to see his primary provider to return to work.  No further letters will be given from urgent care.    Lico Dooley PA-C

## 2019-11-06 NOTE — TELEPHONE ENCOUNTER
Reason for Call:  Other Note    Detailed comments: Pt calling to follow up on status of work note and would like for provider to review and have note ready for  today as soon as possible.  He would like a call back to confirm note is ready .      Phone Number Patient can be reached at: Home number on file [phone] (home)    Best Time: anytime    Can we leave a detailed message on this number? YES    Call taken on 11/6/2019 at 1:13 PM by Benoit Valentino

## 2019-11-06 NOTE — TELEPHONE ENCOUNTER
This writer attempted to contact Radha on 11/06/19      Reason for call letter and unable to leave message.      If patient calls back:   Relay message letter is at .( If he wants know what it says, read the message below from provider), (read verbatim), document that pt called and close encounter        Guadalupe Madsen CMA

## 2019-11-07 NOTE — TELEPHONE ENCOUNTER
I LVM for patient to return call to clinic regarding letter for work and if he already  the letter; he can disregard my message.     Daniel Álvarez, CMA

## 2019-11-08 NOTE — TELEPHONE ENCOUNTER
I called patient again today 11/7/19 at 7:38 p.m. Patient didn't  phone, I LVM on patient's phone that provider printed another letter for him and it's at the  for him to ; and if he has any questions he can call the clinic at [phone].    Daniel Álvarez, CMA

## 2019-11-11 NOTE — TELEPHONE ENCOUNTER
I tried to call patient again today 11/11/19 at 10:49 a.m. and still unable to get a hold of patient. I didn't leave message this time.    Daniel Álvarez, CMA

## 2020-07-09 ENCOUNTER — PATIENT OUTREACH (OUTPATIENT)
Dept: CARE COORDINATION | Facility: CLINIC | Age: 20
End: 2020-07-09

## 2020-07-09 DIAGNOSIS — Z20.822 SUSPECTED COVID-19 VIRUS INFECTION: Primary | ICD-10-CM

## 2020-07-09 NOTE — PROGRESS NOTES
Clinic Care Coordination Contact  [facility]/Voicemail    Referral Source: Non-[facility]  Clinical Data: Care Coordinator Outreach  Outreach attempted x 1.  Left message on patient's voicemail with call back information and requested return call.  Plan: Care Coordinator will try to reach patient again in 1-2 business days.    Melissa Behl BSN, RN, PHN, CCM  Primary Care Clinical RN Care Coordinator  [facility]   [phone]

## 2020-07-09 NOTE — LETTER
[facility]  Complex Care Plan  About Me:    Patient Name:  Radha Garcia    YOB: 2000  Age:         19 year old   Joann MRN:    [id number] Telephone Information:  Home Phone [phone]   Mobile [phone]       Address:  [address] [place] [address] Email address:  No e-mail address on record      Emergency Contact(s)    Name Relationship Lgl Grd Work Phone Home Phone Mobile Phone   1. CHRISTOPHER GRISSOM Other   [phone]            Primary language:  English     needed? No   [facility] [employer]:  [phone] op. 1  Other communication barriers:    Preferred Method of Communication:     Current living arrangement:    Mobility Status/ Medical Equipment:      Health Maintenance  Health Maintenance Reviewed:      My Access Plan  Medical Emergency 911   Primary Clinic Line   -     24 Hour Appointment Line [phone] or  [phone]) (toll-free)   24 Hour Nurse Line [phone] (toll-free)   Preferred Urgent Care     Preferred Hospital     [id number] [address] [facility]     Behavioral Health Crisis Line The National Suicide Prevention Lifeline at 1-108.918.6912 or 91             My Care Team Members  Patient Care Team       Relationship Specialty Notifications Start End    Ramirez Obrien MD PCP - General Internal Medicine  6/11/19     Phone: [phone] Fax: [phone]         [address] [facility] [place] [address]    Ramirez Obrien MD Assigned PCP   5/16/19     Phone: [phone] Fax: [phone]         [address] [facility] [address]    Behl, Melissa K, RN Clinic Care Coordinator Primary Care - CC Admissions 7/9/20     Phone: [phone] Fax: [phone]                My Care Plans  Self Management and Treatment Plan  Goals and (Comments)       Action Plans on File:                       Advance Care  Plans/Directives Type:        My Medical and Care Information  Problem List   Patient Active Problem List   Diagnosis     Rib pain on left side     Muscle tear      Current Medications and Allergies:  See printed Medication Report.    Care Coordination Start Date: No linked episodes   Frequency of Care Coordination:     Form Last Updated: 07/10/2020

## 2020-07-10 ENCOUNTER — PATIENT OUTREACH (OUTPATIENT)
Dept: NURSING | Facility: CLINIC | Age: 20
End: 2020-07-10
Payer: COMMERCIAL

## 2020-07-10 DIAGNOSIS — U07.1 CLINICAL DIAGNOSIS OF COVID-19: Primary | ICD-10-CM

## 2020-07-10 ASSESSMENT — ACTIVITIES OF DAILY LIVING (ADL): DEPENDENT_IADLS:: INDEPENDENT

## 2020-07-10 NOTE — PROGRESS NOTES
Clinic Care Coordination Contact    Clinic Care Coordination Contact  OUTREACH    Referral Information:  Referral Source: [facility]    Primary Diagnosis: Respiratory Disorders - other    Chief Complaint   Patient presents with     Clinic Care Coordination - [facility]     RN ED f/u        [place] Utilization: Patient utilizes [facility] ED.  He has had 5 ED visits in the past year.  Clinic Utilization  Difficulty keeping appointments:: Yes  Compliance Concerns: No  No-Show Concerns: Yes  No PCP office visit in Past Year: Yes  Utilization    Last refreshed: 7/10/2020 11:56 AM:  Hospital Admissions 0           Last refreshed: 7/10/2020 11:56 AM:  ED Visits 0           Last refreshed: 7/10/2020 11:56 AM:  No Show Count (past year) 0              Current as of: 7/10/2020 11:56 AM              Clinical Concerns:  Current Medical Concerns:  Patient was at [facility] ED 7/9/20 and tested positive for COVID-19.  Patient reports starting 7/6/20 he developed sweats, chills and poor appetite.  He states at times his breathing may be noted as faster.  He denies any noticeable shortness of breath, fever or cough.  He reports improvement since symptoms started on 7/6/20.    Patient has been following COVID-19 quarantine guidelines.  He inquired how to get a copy of results for his employer.  RN CC advised patient to contact [facility] ED to obtain a copy.    Patient declines needing Care Coordination services.  Patient Active Problem List   Diagnosis     Rib pain on left side     Muscle tear      Current Behavioral Concerns: n/a      Education Provided to patient: RN CC educated patient on COVID-19 instructions, Care Coordination services, 24 hours nurse triage line and need for preventative care visit.     Pain  Pain (GOAL):: No  Health Maintenance Reviewed: Due/Overdue   Health Maintenance Due   Topic Date Due     PREVENTIVE CARE VISIT  1998     DTAP/TDAP/TD IMMUNIZATION (5 - Tdap) 12/07/2005     HIV  Reason For Visit  OB & Gyn Nurse Reason For Visit:   Patient is present for pt here for urine culture.   patient is not pregnant.   :. n/a.   Chaperone: CATHRYN HERNÁNDEZ was offered a chaperone, but declined. CATHRYN HERNÁNDEZ is accompanied by no one.     :  services not used.        Allergies   1. No Known Drug Allergies    Current Meds   1. Nystatin 708775 UNIT/GM External Powder; APPLY SPARINGLY TO AFFECTED AREA(S)   3 TIMES A DAY;   Therapy: 98Duw3865 to (Evaluate:78Szc0067); Last Rx:22Aug2017 Ordered   2. ProAir  (90 Base) MCG/ACT Inhalation Aerosol Solution; INHALE 1 TO 2 PUFFS   EVERY 4 TO 6 HOURS AS NEEDED;   Therapy: 05Jun2017 to (Last Rx:05Jun2017)  Requested for: 05Jun2017 Ordered    Assessment   1. Acute urinary tract infection (599.0) (N39.0)    Nurse Plan   1. URINE CULTURE; Status:Active; Requested for:48Vdr6824;   DESCRIPTION : URM    Signatures   Electronically signed by : BHAVANA Griffin; Aug 24 2017  1:00PM CST    Electronically signed by : DELANEY TEJADA M.D.; Aug 24 2017  1:06PM CST     SCREENING  12/07/2013     PHQ-2  01/01/2020      Clinical Pathway: None    Medication Management:  Patient only takes Tylenol and ibuprofen as needed.     Functional Status:  Dependent ADLs:: Independent  Dependent IADLs:: Independent  Bed or wheelchair confined:: No  Mobility Status: Independent    Living Situation:  Current living arrangement:: I live in a private home    Lifestyle & Psychosocial Needs:        Diet:: Regular  Inadequate nutrition (GOAL):: No  Inadequate activity/exercise (GOAL):: No  Significant changes in sleep pattern (GOAL): No        Druze or spiritual beliefs that impact treatment:: No  Mental health DX:: No  Mental health management concern (GOAL):: No  Informal Support system:: Family   Socioeconomic History     Marital status: Single     Spouse name: Not on file     Number of children: Not on file     Years of education: Not on file     Highest education level: Not on file     Tobacco Use     Smoking status: Never Smoker     Smokeless tobacco: Never Used               Resources and Interventions:  Current Resources:      Community Resources: None  Supplies used at home:: None  Equipment Currently Used at Home: none    Advance Care Plan/Directive  Advanced Care Plans/Directives on file:: No    Referrals Placed: Other ([facility])     Goals: n/a      Patient/Caregiver understanding: Patient verbalized understanding of information provided and declined needing care coordination services.           Plan:   1. RN CC will mail introduction letter with COVID-19 patient instructions.  2. RN CC placed [facility] referral.  3. RN CC will make no further outreaches, as patient declined Care Coordination services.    Melissa Behl BSN, RN, PHN, CCM  Primary Care Clinical RN Care Coordinator  [facility]   [phone]

## 2020-07-10 NOTE — LETTER
[facility] CARE COORDINATION  [address] [address]   July 10, 2020    Radha Garcia  [address] [facility] [address]  [facility] [place] [facility] [address]      Dear Philip,    I am a clinic care coordinator who works with Ramirez Obrien MD at [facility]. I wanted to thank you for spending the time to talk with me.  Below is a description of clinic care coordination and how I can further assist you.      The clinic care coordination team is made up of a registered nurse,  and community health worker who understand the health care system. The goal of clinic care coordination is to help you manage your health and improve access to the health care system in the most efficient manner. The team can assist you in meeting your health care goals by providing education, coordinating services, strengthening the communication among your providers and supporting you with any resource needs.    Please feel free to contact Community Health Worker Madhavi Eva at [phone] with any questions or concerns. We are focused on providing you with the highest-quality healthcare experience possible and that all starts with you.     Sincerely,     Melissa Behl BSN, RN, PHN, [facility]  Primary Care Clinical RN Care Coordinator  [facility]     Enclosed: I have enclosed helpful educational material. Please review and call me with any questions.  Instructions for Patients  Your symptoms show that you may have coronavirus (COVID-19). This illness can cause fever, cough and trouble breathing. Many people get a mild case and get better on their own. Some people can get very sick.     Not all patients are tested for COVID-19. If you need to be tested, your care team will let you know.    How can I protect others?    Without a test, we can t know for sure that you have COVID-19. For safety, it s very important to follow these  rules.    Stay home and away from others (self-isolate) until:    You ve had no fever--and no medicine that reduces fever--for 3 full days (72 hours). And      Your other symptoms have resolved (gotten better). For example, your cough or breathing has improved. And     At least 10 days have passed since your symptoms started.    During this time:    Stay in your own room (and use your own bathroom), if you can.    Stay away from others in your home. No hugging, kissing or shaking hands.    No visitors.    Don t go to work, school or anywhere else.     Clean  high touch  surfaces often (doorknobs, counters, handles, etc.). Use a household cleaning spray or wipes.    Cover your mouth and nose with a mask, tissue or washcloth to avoid spreading germs.    Wash your hands and face often. Use soap and water.    For more tips, go to https://www.cdc.gov/coronavirus/2019-ncov/downloads/10Things.pdf.    How can I take care of myself?    1. Get lots of rest. Drink extra fluids (unless a doctor has told you not to).     2. Take Tylenol (acetaminophen) for fever or pain. If you have liver or kidney problems, ask your family doctor if it's okay to take Tylenol.     Adults can take either:     650 mg (two 325 mg pills) every 4 to 6 hours, or     1,000 mg (two 500 mg pills) every 8 hours as needed.     Note: Don't take more than 3,000 mg in one day.   Acetaminophen is found in many medicines (both prescribed and over-the-counter medicines). Read all labels to be sure you don't take too much.   For children, check the Tylenol bottle for the right dose. The dose is based on  the child's age or weight.    3. If you have other health problems (like cancer, heart failure, an organ transplant or severe kidney disease): Call your specialty clinic if you don't feel better in the next 2 days.    4. Know when to call 911: If your breathing is so bad that it keeps you from doing normal activities, call 911 or go to the emergency room. Tell  them that you've been staying home and may have COVID-19.    Sign Up for GetWell Loop  COVID-19 Symptoms  We know it's scary to hear you might have COVID-19. We want to track your symptoms to make sure you're OK over the next 2 weeks.    Please look for an email from [employer]. This is a free, online program that we'll use to keep in touch. To sign up, click the link in the email you get.    If you don't get an email, please call your [facility] clinic. Ask them to sign you up for GetWell Loop.    You can learn more at [email or web address].  For informational purposes only. Not to replace the advice of your health care provider.   Copyright   2020 [employer]. Clinically reviewed by Krupa Landry. All rights reserved. [employer] 120329 - 04/20.      Thank you for taking steps to prevent the spread of this virus.  o Limit your contact with others.  o Wear a simple mask to cover your cough.  o Wash your hands well and often.  o If you need medical care, go to [facility] or contact your health care provider.     For more about COVID-19 and caring for yourself at home, visit the CDC website at https://www.cdc.gov/coronavirus/2019-ncov/about/steps-when-sick.html.     To learn about care at [facility], please go to [email or web address].     Below are the COVID-19 hotlines at the [place] Department of Health ([facility]). Interpreters are available.     For health questions: Call 632-782-6382 or 1-556.124.1230 (7 a.m. to 7 p.m.)    For questions about schools and childcare: Call 093-519-4689 or 1-680.534.9676 (7 a.m. to 7 p.m.)

## 2020-07-25 ENCOUNTER — NURSE TRIAGE (OUTPATIENT)
Dept: NURSING | Facility: CLINIC | Age: 20
End: 2020-07-25

## 2020-07-25 NOTE — TELEPHONE ENCOUNTER
Clinic Action Needed: Yes, pt requesting a letter for missed work, see below.  Pt has a virtual visit appt with ANDREI Gracia on 07/27, please address at that time.    Routed to: TANNER Hilario RN/KERLNIE

## 2020-07-25 NOTE — TELEPHONE ENCOUNTER
Pt requesting a letter for missed work today (07/25) through Monday (until his virtual appt).  Pt states he was diagnosed with COVID, he has waited the 14 day quarantine period but is still not feeling the best.  He continues to have body aches and a runny nose.  Pt states he will have to call in sick to work until he feels better, his workplace is requiring a doctor's note for this.      RN will send this message to clinic nurse pool.     He verbalized understanding and had no further questions.     Génesis Hilario RN/KERLINE    Reason for Disposition    [1] Caller requesting NON-URGENT health information AND [2] PCP's office is the best resource    Additional Information    Negative: [1] Caller is not with the adult (patient) AND [2] reporting urgent symptoms    Negative: Lab result questions    Negative: Medication questions    Negative: Caller can't be reached by phone    Negative: Caller has already spoken to PCP or another triager    Negative: RN needs further essential information from caller in order to complete triage    Negative: Requesting regular office appointment    Protocols used: INFORMATION ONLY CALL-A-

## 2020-07-27 ENCOUNTER — VIRTUAL VISIT (OUTPATIENT)
Dept: FAMILY MEDICINE | Facility: CLINIC | Age: 20
End: 2020-07-27
Payer: COMMERCIAL

## 2020-07-27 DIAGNOSIS — U07.1 INFECTION DUE TO 2019 NOVEL CORONAVIRUS: Primary | ICD-10-CM

## 2020-07-27 PROCEDURE — 99213 OFFICE O/P EST LOW 20 MIN: CPT | Mod: 95 | Performed by: NURSE PRACTITIONER

## 2020-07-27 NOTE — PROGRESS NOTES
"Radha Garcia is a 19 year old male who is being evaluated via a billable telephone visit.      The patient has been notified of following:     \"This telephone visit will be conducted via a call between you and your physician/provider. We have found that certain health care needs can be provided without the need for a physical exam.  This service lets us provide the care you need with a short phone conversation.  If a prescription is necessary we can send it directly to your pharmacy.  If lab work is needed we can place an order for that and you can then stop by our lab to have the test done at a later time.    Telephone visits are billed at different rates depending on your insurance coverage. During this emergency period, for some insurers they may be billed the same as an in-person visit.  Please reach out to your insurance provider with any questions.    If during the course of the call the physician/provider feels a telephone visit is not appropriate, you will not be charged for this service.\"    Patient has given verbal consent for Telephone visit?  Yes    What phone number would you like to be contacted at? [phone]    How would you like to obtain your AVS? Mail a copy    Subjective     Radha Garcia is a 19 year old male who presents via phone visit today for the following health issues:    HPI    Follow up from covid  -Still having lower back and intermittent headache  -Sore throat         19 year old male initiated a virtual visit to discuss ongoing COVID symptoms. Tested positive for COVID 19 on 7/9. Originally just had sweaty body and then had worsening symptoms. \"I wasn't having breathing problems but had some shortness of breath.\" Now breathing feels \"different.\" Nose feels clogged up. Feeling better but still sick. No chest pain or shortness of breath now. last fever few days ago - not as bad as initially. Low back aches here and there. Bothers him for 5 min and then resolves. No loss of " bowel or bladder. No saddle anesthesia. Decreased taste still. Smell is improving. Drinking lots of fluids. Needs not to miss work. Works in a warehouse.     Patient Active Problem List   Diagnosis     Rib pain on left side     Muscle tear     History reviewed. No pertinent surgical history.    Social History     Tobacco Use     Smoking status: Never Smoker     Smokeless tobacco: Never Used   Substance Use Topics     Alcohol use: Not on file     History reviewed. No pertinent family history.      Current Outpatient Medications   Medication Sig Dispense Refill     acetaminophen (TYLENOL) 325 MG tablet Take 650 mg by mouth       ibuprofen (ADVIL/MOTRIN) 600 MG tablet Take 600 mg by mouth       methocarbamol (ROBAXIN) 500 MG tablet Take 1 tablet (500 mg) by mouth 4 times daily as needed for muscle spasms (Patient not taking: Reported on 11/4/2019) 30 tablet 1     No Known Allergies    Reviewed and updated as needed this visit by Provider  Tobacco  Allergies  Meds  Problems  Med Hx  Surg Hx  Fam Hx         Review of Systems   Constitutional, HEENT, cardiovascular, pulmonary, GI, , musculoskeletal, neuro, skin, endocrine and psych systems are negative, except as otherwise noted.       Objective   Reported vitals:  There were no vitals taken for this visit.   healthy, alert and no distress  PSYCH: Alert and oriented times 3; coherent speech, normal   rate and volume, able to articulate logical thoughts, able   to abstract reason, no tangential thoughts, no hallucinations   or delusions  His affect is normal  RESP: No cough, no audible wheezing, able to talk in full sentences  Remainder of exam unable to be completed due to telephone visits    Diagnostic Test Results:  Labs reviewed in Epic        Assessment/Plan:    1. Infection due to 2019 novel coronavirus  Needs note to extend time off work as he continues to recover from COVID 19. Doing better overall but still not well enough to work normally.  He will  continue home supportive cares and follow up PRN.      Return in about 1 week (around 8/3/2020), or if symptoms worsen or fail to improve.      Return precautions discussed, including when to seek urgent/emergent care.    Patient verbalizes understanding and agrees with plan of care.      Phone call duration:  6:19 minutes    IMANI Jason CNP

## 2020-07-27 NOTE — LETTER
July 27, 2020      Radha Garcia  [address]  [place] [address]        To Whom It May Concern:    Radha Garcia was seen on 7/27/2020. He continues to have symptoms of COVID 19.  Please excuse him until 8/3/2020 due to illness.        Sincerely,        IMANI Jason CNP

## 2020-07-27 NOTE — PATIENT INSTRUCTIONS
"Discharge Instructions for COVID-19 Patients  You have--or may have--COVID-19. Please follow the instructions listed below.   If you have a weakened immune system, discuss with your doctor any other actions you need to take.  How can I protect others?  If you have symptoms (fever, cough, body aches or trouble breathing):    Stay home and away from others (self-isolate) until:  ? At least 10 days have passed since your symptoms started. And   ? You've had no fever--and no medicine that reduces fever--for 3 full days (72 hours). And   ? Your other symptoms have resolved (gotten better).  If you don't show symptoms, but testing showed that you have COVID-19:    Stay home and away from others (self-isolate) until at least 10 days have passed since the date of your first positive COVID-19 test.  During this time    Stay in your own room, even for meals. Use your own bathroom if you can.    Stay away from others in your home. No hugging, kissing or shaking hands. No visitors.    Don't go to work, school or anywhere else.    Clean \"high touch\" surfaces often (doorknobs, counters, handles). Use household cleaning spray or wipes. You'll find a full list of  on the EPA website: www.epa.gov/pesticide-registration/list-n-disinfectants-use-against-sars-cov-2.    Cover your mouth and nose with a mask, tissue or wash cloth to avoid spreading germs.    Wash your hands and face often. Use soap and water.    Caregivers in these groups are at risk for severe illness due to COVID-19:  ? People 65 years and older  ? People who live in a nursing home or long-term care facility  ? People with chronic disease (lung, heart, cancer, diabetes, kidney, liver, immunologic)  ? People who have a weakened immune system, including those who:    Are in cancer treatment    Take medicine that weakens the immune system, such as corticosteroids    Had a bone marrow or organ transplant    Have an immune deficiency    Have poorly controlled HIV or " AIDS    Are obese (body mass index of 40 or higher)    Smoke regularly    Caregivers should wear gloves while washing dishes, handling laundry and cleaning bedrooms and bathrooms.    Use caution when washing and drying laundry: Don't shake dirty laundry and use the warmest water setting that you can.    For more tips on managing your health at home, go to www.cdc.gov/coronavirus/2019-ncov/downloads/10Things.pdf.  How can I take care of myself at home?  1. Get lots of rest. Drink extra fluids (unless a doctor has told you not to).  2. Take Tylenol (acetaminophen) for fever or pain. If you have liver or kidney problems, ask your family doctor if it's okay to take Tylenol.     Adults can take either:  ? 650 mg (two 325 mg pills) every 4 to 6 hours, or   ? 1,000 mg (two 500 mg pills) every 8 hours as needed.  ? Note: Don't take more than 3,000 mg in one day. Acetaminophen is found in many medicines (both prescribed and over-the-counter medicines). Read all labels to be sure you don't take too much.   For children, check the Tylenol bottle for the right dose. The dose is based on the child's age or weight.  3. If you have other health problems (like cancer, heart failure, an organ transplant or severe kidney disease): Call your specialty clinic if you don't feel better in the next 2 days.  4. Know when to call 911. Emergency warning signs include:  ? Trouble breathing or shortness of breath  ? Pain or pressure in the chest that doesn't go away  ? Feeling confused like you haven't felt before, or not being able to wake up  ? Bluish-colored lips or face  5. Your doctor may have prescribed a blood thinner medicine. Follow their instructions.  Where can I get more information?     [facility] - About COVID-19:   [email or web address]    CDC - What to Do If You're Sick: www.cdc.gov/coronavirus/2019-ncov/about/steps-when-sick.html    CDC - Ending Home Isolation:  www.cdc.gov/coronavirus/2019-ncov/hcp/disposition-in-home-patients.html    CDC - Caring for Someone: www.cdc.gov/coronavirus/2019-ncov/if-you-are-sick/care-for-someone.html    [facility] - Interim Guidance for Hospital Discharge to Home: www.[facility].[place]/diseases/coronavirus/hcp/hospdischarge.pdf    [facility] clinical trials (COVID-19 research studies): clinicalaffairs.[facility]-clinical-trials    Below are the COVID-19 hotlines at the [place] Department of Health ([facility]). Interpreters are available.  ? For health questions: Call 323-714-4667 or 1-133.325.2114 (7 a.m. to 7 p.m.)  ? For questions about schools and childcare: Call 188-615-0040 or 1-799.250.5486 (7 a.m. to 7 p.m.)    For informational purposes only. Not to replace the advice of your health care provider. Clinically reviewed by the Infection Prevention Team.Copyright   2020 [facility]. All rights reserved. [employer] [id number] - 06/20.

## 2020-08-06 ENCOUNTER — VIRTUAL VISIT (OUTPATIENT)
Dept: FAMILY MEDICINE | Facility: CLINIC | Age: 20
End: 2020-08-06
Payer: COMMERCIAL

## 2020-08-06 DIAGNOSIS — U07.1 PNEUMONIA DUE TO 2019 NOVEL CORONAVIRUS: ICD-10-CM

## 2020-08-06 DIAGNOSIS — Z86.16 HISTORY OF 2019 NOVEL CORONAVIRUS DISEASE (COVID-19): ICD-10-CM

## 2020-08-06 DIAGNOSIS — J12.82 PNEUMONIA DUE TO 2019 NOVEL CORONAVIRUS: ICD-10-CM

## 2020-08-06 DIAGNOSIS — Z20.822 SUSPECTED COVID-19 VIRUS INFECTION: Primary | ICD-10-CM

## 2020-08-06 PROCEDURE — 99213 OFFICE O/P EST LOW 20 MIN: CPT | Mod: 95 | Performed by: INTERNAL MEDICINE

## 2020-08-06 RX ORDER — DEXAMETHASONE 6 MG/1
6 TABLET ORAL DAILY
Qty: 10 TABLET | Refills: 0 | Status: SHIPPED | OUTPATIENT
Start: 2020-08-06 | End: 2020-08-26

## 2020-08-06 RX ORDER — CEFDINIR 300 MG/1
300 CAPSULE ORAL 2 TIMES DAILY
Qty: 20 CAPSULE | Refills: 0 | Status: SHIPPED | OUTPATIENT
Start: 2020-08-06 | End: 2020-08-16

## 2020-08-06 NOTE — LETTER
REPORT OF WORK ABILITY    [address] [address]  [phone]      PATIENT DATA    Employee Name: Radha Garcia      : 2000     #: [id number]    Today's date: 2020  Date of first visit: 2020 ([facility]).  Date of last visit: 2020    PROVIDER EVALUATION: Please fill in as needed.  Please give copy to employee for employer.    1. Diagnosis: COVID-19 virus infection                         2. Treatment: Rest and pharmacotherapy  3. Medication: Dexamethasone and Cefdinir    RESTRICTIONS: Unlimited unless listed.  Restrictions apply to home and leisure also.  If work restrictions is not available, the employee is totally disabled.    Maximum Medical Improvement (Date): Tentatively, 2020  Any Permanent Partial Disability? 0%    Provider comments: Mr. Garcia tested positive for COVID-19 virus infection during his ER visit at [facility] last 2020. He still complains of symptoms related to COVID-19 virus infection. Based on today's visit, Mr. Garcia will require treatment for possible pneumonia secondary to COVID-19.     Medical Examiner: Ramirez Obrien MD          License or registration: [place] [id number]    Next appointment: 2 weeks    CC: Employer, Managed Care Plan/Payor, Patient

## 2020-08-06 NOTE — PROGRESS NOTES
"Radha Garcia is a 19 year old male who is being evaluated via a billable telephone visit.      The patient has been notified of following:     \"This telephone visit will be conducted via a call between you and your physician/provider. We have found that certain health care needs can be provided without the need for a physical exam.  This service lets us provide the care you need with a short phone conversation.  If a prescription is necessary we can send it directly to your pharmacy.  If lab work is needed we can place an order for that and you can then stop by our lab to have the test done at a later time.    Telephone visits are billed at different rates depending on your insurance coverage. During this emergency period, for some insurers they may be billed the same as an in-person visit.  Please reach out to your insurance provider with any questions.    If during the course of the call the physician/provider feels a telephone visit is not appropriate, you will not be charged for this service.\"    Patient has given verbal consent for Telephone visit?  Yes    What phone number would you like to be contacted at? [phone]    How would you like to obtain your AVS? Mail a copy    Subjective     Radha Garcia is a 19 year old male who presents via phone visit today for the following health issues:    HPI      Concern for COVID-19  About how many days ago did these symptoms start? 1 month  Is this your first visit for this illness? No   How would you describe your symptoms since your last visit? My symptoms have worsened  In the 14 days before your symptoms started, have you had close contact with someone with COVID-19 (Coronavirus)? Yes, I have been in contact with someone who has COVID-19/Coronavirus (confirmed by lab test last 7/9/2020, done at [facility].  Do you have a fever or chills? Yes, I felt feverish or had chills  Are you having new or worsening difficulty breathing? Yes   Please describe " what kind of difficulty you are having breathing:Moderate dyspnea (short of breath with ADLs/walking across room, quick recovery/comfortable with rest)  Do you have new or worsening cough? Yes, it's a dry cough.   Have you had any new or unexplained body aches? No    Have you experienced any of the following NEW symptoms?    Headache: No    Sore throat: No    Loss of taste or smell: YES    Chest pain: No    Diarrhea: No    Rash: No     Fatigue: YES, on a good day, 70%  What treatments have you tried? Tylenol and ibuprofen  Who do you live with? Brother and daughter and girlfriend  Are you, or a household member, a healthcare worker or a ? No  Do you live in a nursing home, group home, or shelter? No  Do you have a way to get food/medications if quarantined? Yes, I have a friend or family member who can help me.        Patient Active Problem List   Diagnosis     Rib pain on left side     Muscle tear     History reviewed. No pertinent surgical history.    Social History     Tobacco Use     Smoking status: Never Smoker     Smokeless tobacco: Never Used   Substance Use Topics     Alcohol use: Not on file     History reviewed. No pertinent family history.      No Known Allergies  No lab results found.   BP Readings from Last 3 Encounters:   11/04/19 119/77   06/12/19 131/80   06/10/19 133/83    Wt Readings from Last 3 Encounters:   11/04/19 76 kg (167 lb 9.6 oz)   06/12/19 77.6 kg (171 lb)   06/10/19 78.9 kg (174 lb)                    Reviewed and updated as needed this visit by Provider         Review of Systems   CONSTITUTIONAL:POSITIVE  for chills, fatigue, fever , malaise and sweats  INTEGUMENTARY/SKIN: NEGATIVE for worrisome rashes, moles or lesions  EYES: NEGATIVE for vision changes or irritation  ENT/MOUTH: NEGATIVE for ear, mouth and throat problems  RESP: POSITIVE for significant dry cough and shortness of breath.  CV: NEGATIVE for chest pain, palpitations or peripheral edema  GI: NEGATIVE for  nausea, abdominal pain, heartburn, or change in bowel habits  : NEGATIVE for frequency, dysuria, or hematuria  MUSCULOSKELETAL: NEGATIVE for significant arthralgias or myalgia  NEURO: NEGATIVE for weakness, dizziness or paresthesias  ENDOCRINE: NEGATIVE for temperature intolerance, skin/hair changes  HEME: NEGATIVE for bleeding problems  PSYCHIATRIC: NEGATIVE for changes in mood or affect       Objective   Reported vitals:  There were no vitals taken for this visit.     Remainder of exam unable to be completed due to telephone visits    Diagnostic Test Results:  Labs reviewed in Epic        Assessment/Plan:    1. Suspected COVID-19 virus infection    - Symptomatic COVID-19 Virus (Coronavirus) by PCR; Future    2. Pneumonia due to 2019 novel coronavirus    - dexamethasone (DECADRON) 6 MG tablet; Take 1 tablet (6 mg) by mouth daily for 10 days  Dispense: 10 tablet; Refill: 0  - cefdinir (OMNICEF) 300 MG capsule; Take 1 capsule (300 mg) by mouth 2 times daily for 10 days  Dispense: 20 capsule; Refill: 0  - XR Chest 2 Views; Future    3. History of 2019 novel coronavirus disease (COVID-19)    - Symptomatic COVID-19 Virus (Coronavirus) by PCR; Future    Follow-up in two weeks or earlier as needed.    Phone call duration:  11  Minutes  Start: 3:34 PM  End: 3:45 PM    Ramirez Obrien MD

## 2020-08-06 NOTE — PATIENT INSTRUCTIONS
At [facility], we strive to deliver an exceptional experience to you, every time we see you. If you receive a survey, please complete it as we do value your feedback.  If you have MyChart, you can expect to receive results automatically within 24 hours of their completion.  Your provider will send a note interpreting your results as well.   If you do not have MyChart, you should receive your results in about a week by mail.    Your care team:                            Family Medicine Internal Medicine   MD Ramirez Cabrera, MD Alexander Ellis MD Katya Georgiev PA-C  Liliana Garcia, APRN CNP    Pelon Landry, MD Pediatrics   David Chen, PAApoloniaC  Deisi Pimentel, CNP MD Magalie Padilla APRN CNP   MD Joana Fernandez MD Deborah Mielke, MD Nya Reyes, APRN CNP  Saima Abdi, PAApoloniaC  Betty Madrigal, CNP  MD Elysia Sandoval MD Angela Wermerskirchen, MD      Clinic hours: Monday - Thursday 7 am-7 pm; Fridays 7 am-5 pm.   Urgent care: Monday - Friday 11 am-9 pm; Saturday and Sunday 9 am-5 pm.    Clinic: [phone]       [facility]: Monday - Thursday 8 am - 7 pm; Friday 8 am - 6 pm  [facility]: [phone]     Use [email or web address] for 24/7 diagnosis and treatment of dozens of conditions.'

## 2020-08-07 ENCOUNTER — ANCILLARY PROCEDURE (OUTPATIENT)
Dept: GENERAL RADIOLOGY | Facility: CLINIC | Age: 20
End: 2020-08-07
Attending: INTERNAL MEDICINE
Payer: COMMERCIAL

## 2020-08-07 DIAGNOSIS — U07.1 PNEUMONIA DUE TO 2019 NOVEL CORONAVIRUS: ICD-10-CM

## 2020-08-07 DIAGNOSIS — J12.82 PNEUMONIA DUE TO 2019 NOVEL CORONAVIRUS: ICD-10-CM

## 2020-08-07 PROCEDURE — 71046 X-RAY EXAM CHEST 2 VIEWS: CPT

## 2020-08-21 ENCOUNTER — TELEPHONE (OUTPATIENT)
Dept: FAMILY MEDICINE | Facility: CLINIC | Age: 20
End: 2020-08-21

## 2020-08-21 NOTE — TELEPHONE ENCOUNTER
Reason for Call:  Other letter    Detailed comments: Pt would like to request a letter for time off of work. Please call back to advise or confirm. Thank you.    Phone Number Patient can be reached at: Home number on file [phone] (home)    Best Time: Any    Can we leave a detailed message on this number? YES    Call taken on 8/21/2020 at 1:53 PM by Monica Toure

## 2020-08-25 NOTE — PROGRESS NOTES
"Radha Garcia is a 19 year old male who is being evaluated via a billable telephone visit.      The patient has been notified of following:     \"This telephone visit will be conducted via a call between you and your physician/provider. We have found that certain health care needs can be provided without the need for a physical exam.  This service lets us provide the care you need with a short phone conversation.  If a prescription is necessary we can send it directly to your pharmacy.  If lab work is needed we can place an order for that and you can then stop by our lab to have the test done at a later time.    Telephone visits are billed at different rates depending on your insurance coverage. During this emergency period, for some insurers they may be billed the same as an in-person visit.  Please reach out to your insurance provider with any questions.    If during the course of the call the physician/provider feels a telephone visit is not appropriate, you will not be charged for this service.\"    Patient has given verbal consent for Telephone visit?  {YES-NO  Default Yes:4444::\"Yes\"}    What phone number would you like to be contacted at? ***    How would you like to obtain your AVS? {AVS Preference:010048}    Subjective     Radha Garcia is a 19 year old male who presents via phone visit today for the following health issues:    HPI    {SUPERLIST (Optional):590208}  {PEDS Chronic and Acute Problems (Optional):220218}     {additonal problems for provider to add (Optional):239742}    Review of Systems   {ROS COMP (Optional):416841}       Objective          Vitals:  No vitals were obtained today due to virtual visit.    {GENERAL APPEARANCE:50::\"healthy\",\"alert\",\"no distress\"}  PSYCH: Alert and oriented times 3; coherent speech, normal   rate and volume, able to articulate logical thoughts, able   to abstract reason, no tangential thoughts, no hallucinations   or delusions  His affect is { " ":0465980::\"normal\"}  RESP: No cough, no audible wheezing, able to talk in full sentences  Remainder of exam unable to be completed due to telephone visits    {Diagnostic Test Results (Optional):011709}        Assessment/Plan:    {PROVIDER CHARTING PREFERENCE SOAPO:906638}    Phone call duration:  *** minutes              "

## 2020-08-26 ENCOUNTER — VIRTUAL VISIT (OUTPATIENT)
Dept: FAMILY MEDICINE | Facility: CLINIC | Age: 20
End: 2020-08-26
Payer: COMMERCIAL

## 2020-08-26 DIAGNOSIS — U07.1 2019 NOVEL CORONAVIRUS DISEASE (COVID-19): ICD-10-CM

## 2020-08-26 DIAGNOSIS — R06.02 SOB (SHORTNESS OF BREATH): Primary | ICD-10-CM

## 2020-08-26 PROCEDURE — 99213 OFFICE O/P EST LOW 20 MIN: CPT | Mod: 95 | Performed by: PHYSICIAN ASSISTANT

## 2020-08-26 RX ORDER — ALBUTEROL SULFATE 90 UG/1
2 AEROSOL, METERED RESPIRATORY (INHALATION) EVERY 6 HOURS
Qty: 1 INHALER | Refills: 0 | Status: SHIPPED | OUTPATIENT
Start: 2020-08-26

## 2020-08-26 RX ORDER — CEFDINIR 300 MG/1
300 CAPSULE ORAL 2 TIMES DAILY
COMMUNITY

## 2020-08-26 NOTE — PROGRESS NOTES
"Radha Garcia is a 19 year old male who is being evaluated via a billable telephone visit.      The patient has been notified of following:     \"This telephone visit will be conducted via a call between you and your physician/provider. We have found that certain health care needs can be provided without the need for a physical exam.  This service lets us provide the care you need with a short phone conversation.  If a prescription is necessary we can send it directly to your pharmacy.  If lab work is needed we can place an order for that and you can then stop by our lab to have the test done at a later time.    Telephone visits are billed at different rates depending on your insurance coverage. During this emergency period, for some insurers they may be billed the same as an in-person visit.  Please reach out to your insurance provider with any questions.    If during the course of the call the physician/provider feels a telephone visit is not appropriate, you will not be charged for this service.\"    Patient has given verbal consent for Telephone visit?  Yes    What phone number would you like to be contacted at? [phone]    How would you like to obtain your AVS? Bushra Abreu     Radha Garcia is a 19 year old male who presents via phone visit today for the following health issues:    HPI      Concern for COVID-19, was tested positive about a month ago  About how many days ago did these symptoms start? July  Is this your first visit for this illness? No   How would you describe your symptoms since your last visit? My symptoms have improved  In the 14 days before your symptoms started, have you had close contact with someone with COVID-19 (Coronavirus)? Yes, I have been in contact with someone who has COVID-19/Coronavirus (confirmed by lab test).  Do you have a fever or chills? No  Are you having new or worsening difficulty breathing? No  Do you have new or worsening cough? No  Have you had any " new or unexplained body aches? No    Have you experienced any of the following NEW symptoms?    Headache: No    Sore throat: No    Loss of taste or smell: YES- still having some loss of state and smell. Slowly getting better    Chest pain: No    Diarrhea: No    Rash: No  What treatments have you tried? Exercise, fluids, Decadron, Omnicef  Who do you live with? Brother and girlfriend   Are you, or a household member, a healthcare worker or a ? No  Do you live in a nursing home, group home, or shelter? No  Do you have a way to get food/medications if quarantined? Yes, I have a friend or family member who can help me.    Patient reports that he is fully recovered, but sometimes he gets shortness of breath that lasts a few seconds and goes away. Patient reports that when it happens it does not serious but it just slightly bothersome.  No fever, no chest pain, no fatigue.    Also patient wants to know when he can return to work. He has not had a second test to show that he is negative for corona.        Review of Systems   Constitutional, HEENT, cardiovascular, pulmonary, gi and gu systems are negative, except as otherwise noted.       Objective        Vitals:  No vitals were obtained today due to virtual visit.    healthy, alert and no distress  PSYCH: Alert and oriented times 3; coherent speech, normal   rate and volume, able to articulate logical thoughts, able   to abstract reason, no tangential thoughts, no hallucinations   or delusions  His affect is normal  RESP: No cough, no audible wheezing, able to talk in full sentences  Remainder of exam unable to be completed due to telephone visits            Assessment/Plan:    Assessment & Plan     Radha was seen today for cough.    Diagnoses and all orders for this visit:    SOB (shortness of breath)  -     albuterol (PROAIR HFA/PROVENTIL HFA/VENTOLIN HFA) 108 (90 Base) MCG/ACT inhaler; Inhale 2 puffs into the lungs every 6 hours    2019 novel  coronavirus disease (COVID-19)  -     albuterol (PROAIR HFA/PROVENTIL HFA/VENTOLIN HFA) 108 (90 Base) MCG/ACT inhaler; Inhale 2 puffs into the lungs every 6 hours  -     Symptomatic COVID-19 Virus (Coronavirus) by PCR; Future    patient will use Albuterol when he gets momentary shortness of breath.   Patient sounded well on the phone, no wheezing, shortness of breath, talked fast, a lot and in full sentences.   Note was provided  Patient needs to take second Covid test before he returns to work.         No follow-ups on file.    Caryn Abdalla PA-C  [facility]    Phone call duration:  14 minutes

## 2020-08-26 NOTE — LETTER
[address] [address]  Phone: [phone]    August 26, 2020        Radha Garcia  [address]  [place] [address]          To whom it may concern:    RE: Radha Aguilarramu    Patient was seen and treated today at our clinic and missed work.    Please contact me for questions or concerns.      Sincerely,

## 2020-08-26 NOTE — LETTER
[address] [address]  Phone: [phone]    August 26, 2020        Radha Garcia  [address]  [place] [address]          To whom it may concern:    RE: Radha Garcia    Patient was seen and treated today at our clinic and missed work 8/26/2020-9/2/20 due to symptom related to Covid-19 infection.   Next appointment in 1 week.     Please contact me for questions or concerns.      Sincerely,    Zelda Abdalla PAC

## 2020-09-02 ENCOUNTER — VIRTUAL VISIT (OUTPATIENT)
Dept: FAMILY MEDICINE | Facility: CLINIC | Age: 20
End: 2020-09-02
Payer: COMMERCIAL

## 2020-09-02 DIAGNOSIS — U07.1 2019 NOVEL CORONAVIRUS DISEASE (COVID-19): Primary | ICD-10-CM

## 2020-09-02 PROCEDURE — 99213 OFFICE O/P EST LOW 20 MIN: CPT | Mod: 95 | Performed by: NURSE PRACTITIONER

## 2020-09-02 NOTE — PROGRESS NOTES
"Radha Garcia is a 19 year old male who is being evaluated via a billable video visit.      The patient has been notified of following:     \"This video visit will be conducted via a call between you and your physician/provider. We have found that certain health care needs can be provided without the need for an in-person physical exam.  This service lets us provide the care you need with a video conversation.  If a prescription is necessary we can send it directly to your pharmacy.  If lab work is needed we can place an order for that and you can then stop by our lab to have the test done at a later time.    Video visits are billed at different rates depending on your insurance coverage.  Please reach out to your insurance provider with any questions.    If during the course of the call the physician/provider feels a video visit is not appropriate, you will not be charged for this service.\"    Patient has given verbal consent for Video visit? Yes  How would you like to obtain your AVS? MyChart  If you are dropped from the video visit, the video invite should be resent to: Text to cell phone: [phone]  Will anyone else be joining your video visit? No    Subjective     Radha Garcia is a 19 year old male who presents today via video visit for the following health issues:    HPI      Concern for COVID-19  About how many days ago did these symptoms start? July-diagnosed 7/9/2020.  Is this your first visit for this illness? No   How would you describe your symptoms since your last visit? My symptoms have improved 75-80% of baseline, still has fatigue, decreased motivation.  In the 14 days before your symptoms started, have you had close contact with someone with COVID-19 (Coronavirus)? Yes, I have been in contact with someone who has symptoms of COVID-19/Coronavirus-Dad was diagnosed with COVID last week.  Do you have a fever or chills? No  Are you having new or worsening difficulty breathing? Yes   Please " describe what kind of difficulty you are having breathing:No dyspnea, or dyspnea at patients normal baseline  Do you have new or worsening cough? Yes, it's a dry cough.   Have you had any new or unexplained body aches? No    Have you experienced any of the following NEW symptoms?    Headache: No    Sore throat: No    Loss of taste or smell: No    Chest pain: No    Diarrhea: No    Rash: No  What treatments have you tried? Decadron, Albuterol  Who do you live with? Girlfriend and younger brother both of whom are well  Are you, or a household member, a healthcare worker or a ? No  Do you live in a nursing home, group home, or shelter? No   Do you have a way to get food/medications if quarantined? Yes, I have a friend or family member who can help me.  He is wondering if he can get more Decadron as it made him feel better- he is anxious to return to work but still feels fatigued.       Video Start Time: 2:51      Review of Systems   Constitutional, HEENT, cardiovascular, pulmonary, gi and gu systems are negative, except as otherwise noted.      Objective           Vitals:  No vitals were obtained today due to virtual visit.    Physical Exam     GENERAL: Healthy, alert and no distress  EYES: Eyes grossly normal to inspection.  No discharge or erythema, or obvious scleral/conjunctival abnormalities.  RESP: No audible wheeze, cough, or visible cyanosis.  No visible retractions or increased work of breathing.    SKIN: Visible skin clear. No significant rash, abnormal pigmentation or lesions.  NEURO: Cranial nerves grossly intact.  Mentation and speech appropriate for age.  PSYCH: Mentation appears normal, affect normal/bright, judgement and insight intact, normal speech and appearance well-groomed.          Assessment & Plan     2019 novel coronavirus disease (COVID-19)  He is about 75-80% if kye baseline, denies cough, shortness of breath, wheezing so no indication for further Decadron at this time.   Continue to treat symptomatically, schedule COVID test as his employer requires a negative test to return to work.       See Patient Instructions    No follow-ups on file.    IMANI Claudio CNP  [facility]      Video-Visit Details    Type of service:  Video Visit    Video End Time:3:12 PM    Originating Location (pt. Location): Home    Distant Location (provider location):  [facility]     Platform used for Video Visit: Ernie

## 2020-09-02 NOTE — PATIENT INSTRUCTIONS
Patient Education     Coronavirus Disease 2019 (COVID-19): Caring for Yourself or Others   If you or a household member have symptoms of COVID-19, follow the guidelines below. This will help you manage symptoms and keep the virus from spreading.  If you have symptoms of COVID-19    Stay home and contact your care team. They will tell you what to do    Don t panic. Keep in mind that other illnesses can cause similar symptoms.    Stay away from work, school, and public places.    Limit physical contact with others in your home. Limit visitors. No kissing.    Clean surfaces you touch with disinfectant.    If you need to cough or sneeze, do it into a tissue. Then, throw the tissue into the trash. If you don't have tissues, cough or sneeze into the bend of your elbow.    Don t share food or personal items with people in your household. This includes items like eating and drinking utensils, towels, and bedding.    Wear a cloth face mask around other people. It should cover your nose and mouth. You may need to make your own mask using a bandana, T-shirt, or other cloth. See the CDC s instructions on how to make a mask.    If you need to go to a hospital or clinic, call ahead to let them know. Expect the care team to wear masks, gowns, gloves, and eye protection. You may be put in a separate room.    Follow all instructions from your care team.    If you have been diagnosed with COVID-19    Stay home and away from others, including other people in your home. (This is called self-isolation.) Don t leave home unless you need to get medical care. Don't go to work, school, or public places. Don't use buses, taxis, or other public transportation.    Follow all instructions from your care team.    If you need to go to a hospital or clinic, call ahead to let them know. Expect the care team to wear masks, gowns, gloves, and eye protection. You may be put in a separate room.    Wear a face mask over your nose and mouth. This is to  protect others from your germs. If you can t wear a mask, your caregivers should wear one. You may need to make your own mask using a bandana, T-shirt, or other cloth. See the CDC s instructions on how to make a mask.    Have no contact with pets and other animals.    Don t share food or personal items with people in your household. This includes items like eating and drinking utensils, towels, and bedding.    Don t share food or personal items with people in your household. This includes items like eating and drinking utensils, towels, and bedding.    If you need to cough or sneeze, do it into a tissue. Then, throw the tissue into the trash. If you don't have tissues, cough or sneeze into the bend of your elbow.    Wash your hands often.    Self-care at home   At this time, there is no medicine approved to prevent or treat COVID-19. Experts are testing different medicines, trying to find one that works.  So far, the most proven treatment is to support your body while it fights the virus.    Get extra rest.    Drink extra fluids (6 to 8 glasses of liquids each day), unless a doctor has told you not to. Ask your care team which fluids are best for you. Avoid fluids that contain caffeine or alcohol.    Taking over-the-counter (OTC) pain medicine to reduce pain and fever. Your care team will tell you which medicine to use.  If you ve been in the hospital for COVID-19, follow your care team s instructions. They will tell you when to stop self-isolation. They may also have you change positions to help your breathing (such as lying on your belly).  If a test showed that you have COVID-19, you may be asked to donate plasma after you ve recovered. (This is called COVID-19 convalescent plasma donation.) The plasma may contain antibodies to help fight the virus in others. Scientists are testing whether this might be a treatment in the future. For more information, talk to your care team.  Caring for a sick person     Follow  all instructions from the care team.    Wash your hands often.    Wear protective clothing as advised.    Make sure the sick person wears a mask. If they can't wear a mask, don't stay in the same room with them. If you must be in the same room, wear a face mask. Make sure the mask covers both the nose and mouth.    Keep track of the sick person s symptoms.    Clean surfaces often with disinfectant. This includes phones, kitchen counters, fridge door handles, bathroom surfaces, and others.    Don t let anyone share household items with the sick person. This includes eating and drinking tools, towels, sheets, and blankets.    Clean fabrics and laundry well.    Keep other people and pets away from the sick person.    When you can stop self-isolation  When you are sick with COVID-19, you should stay away from other people. This is called self-isolation. The rules for ending self-isolation depend on your health in general.  If you are normally healthy  You can stop self-isolation when all 3 of these are true:  1. You ve had no fever--and no medicine that reduces fever--for at least 24 hours. And   2. Your symptoms are better, such as cough or trouble breathing. And   3. At least 10 days have passed since your symptoms first started.  Talk with your care team before you leave home. They may tell you it s okay to leave, or they may give you different advice.  If you have a weak immune system  If you re being treated for cancer, have an immune disorder (such as HIV), or have had a transplant (organ or bone marrow), follow your care team s instructions. You may be able to end self-isolation when all 3 of these are true:  1. You have no fever without fever-reducing medicine. And   2. Your symptoms are better, such as cough or trouble breathing. And   3. You ve had 2 tests for COVID-19. The tests happened at least 24 hours apart, and both show that you don t have the virus. (If no tests are available, your care team may tell  you to follow the rules for normally healthy people above.)  When you return to public settings  When you are well enough to go outside your home, follow the CDC s guidance on cloth face masks.    Anyone over age 2 should wear a face mask in public, especially when it's hard to socially distance. This includes public transit, public protests and marches, and crowded stores, bars, and restaurants.    Face masks are most likely to reduce the spread of COVID-19 when they are widely used by people who are out in the public.  Certain people should not wear a face covering. These include:     Children under 2 years old    Anyone with a health, developmental, or mental health condition that can be made worse by wearing a mask    Anyone who is unconscious or unable to remove the face covering without help. See the CDC's guidance on who should not wear a face mask.  When to call your care team  Call your care team right away if a sick person has any of these:    Trouble breathing    Pain or pressure in chest  If a sick person has any of these, call 911:    Trouble breathing that gets worse    Pain or pressure in chest that gets worse    Blue tint to lips or face    Fast or irregular heartbeat    Confusion or trouble waking    Fainting or loss of consciousness    Coughing up blood  Going home from the hospital   If you have COVID-19 and were recently in the hospital:    Follow the instructions above for self-care and isolation.    Follow the hospital care team s instructions.    Ask questions if anything is unclear to you. Write down answers so you remember them.  Date last modified: 8/12/2020  [employer] last reviewed this educational content on 4/1/2020 2000-2020 [employer]. [address] [place] [address], [place] [address]. All rights reserved. This information is not intended as a substitute for professional medical care. Always follow your healthcare professional's instructions.

## 2020-09-02 NOTE — LETTER
September 2, 2020      Radha Garcia  [address]  [place] [address]        To Whom It May Concern:    Radha Garcia was seen in our clinic. He may return to work without restrictions on Tuesday 9/8/2020..      Sincerely,        IMANI Claudio CNP

## 2020-09-02 NOTE — Clinical Note
Pls print letter from today- I cannot print it from [facility]- and fax to me.  Sonia DIALLO, CNP

## 2020-09-04 DIAGNOSIS — U07.1 2019 NOVEL CORONAVIRUS DISEASE (COVID-19): ICD-10-CM

## 2020-09-04 PROCEDURE — U0003 INFECTIOUS AGENT DETECTION BY NUCLEIC ACID (DNA OR RNA); SEVERE ACUTE RESPIRATORY SYNDROME CORONAVIRUS 2 (SARS-COV-2) (CORONAVIRUS DISEASE [COVID-19]), AMPLIFIED PROBE TECHNIQUE, MAKING USE OF HIGH THROUGHPUT TECHNOLOGIES AS DESCRIBED BY CMS-2020-01-R: HCPCS | Performed by: FAMILY MEDICINE

## 2020-09-06 LAB
SARS-COV-2 RNA SPEC QL NAA+PROBE: NOT DETECTED
SPECIMEN SOURCE: NORMAL

## 2020-09-07 NOTE — RESULT ENCOUNTER NOTE
Dear Radha    Your test results are attached. I am happy to let you know that they are stable.    The test for COVID 19 was negative.     Please contact me by Kazeont if you have any questions about your labs or management. You may also call my office number [phone] for any questions.     Jacqueline Russell MD for RADHA Griffin

## 2020-09-21 ENCOUNTER — E-VISIT (OUTPATIENT)
Dept: FAMILY MEDICINE | Facility: CLINIC | Age: 20
End: 2020-09-21
Payer: COMMERCIAL

## 2020-09-21 DIAGNOSIS — Z02.6 WORKER'S COMPENSATION CLAIM ADMINISTRATIVE PROBLEM: ICD-10-CM

## 2020-09-21 DIAGNOSIS — M54.9 ACUTE BACK PAIN, UNSPECIFIED BACK LOCATION, UNSPECIFIED BACK PAIN LATERALITY: Primary | ICD-10-CM

## 2020-09-21 PROCEDURE — 99207 ZZC NO BILLABLE SERVICE THIS VISIT: CPT | Performed by: PHYSICIAN ASSISTANT

## 2020-09-22 ENCOUNTER — TRANSFERRED RECORDS (OUTPATIENT)
Dept: HEALTH INFORMATION MANAGEMENT | Facility: CLINIC | Age: 20
End: 2020-09-22

## 2020-10-06 ENCOUNTER — TRANSFERRED RECORDS (OUTPATIENT)
Dept: HEALTH INFORMATION MANAGEMENT | Facility: CLINIC | Age: 20
End: 2020-10-06

## 2020-11-17 ENCOUNTER — TRANSFERRED RECORDS (OUTPATIENT)
Dept: HEALTH INFORMATION MANAGEMENT | Facility: CLINIC | Age: 20
End: 2020-11-17

## 2021-01-03 ENCOUNTER — HEALTH MAINTENANCE LETTER (OUTPATIENT)
Age: 21
End: 2021-01-03

## 2021-01-11 ENCOUNTER — TRANSFERRED RECORDS (OUTPATIENT)
Dept: HEALTH INFORMATION MANAGEMENT | Facility: CLINIC | Age: 21
End: 2021-01-11

## 2021-01-26 ENCOUNTER — TRANSFERRED RECORDS (OUTPATIENT)
Dept: HEALTH INFORMATION MANAGEMENT | Facility: CLINIC | Age: 21
End: 2021-01-26

## 2021-03-22 ENCOUNTER — TRANSFERRED RECORDS (OUTPATIENT)
Dept: HEALTH INFORMATION MANAGEMENT | Facility: CLINIC | Age: 21
End: 2021-03-22

## 2021-06-01 ENCOUNTER — TRANSFERRED RECORDS (OUTPATIENT)
Dept: HEALTH INFORMATION MANAGEMENT | Facility: CLINIC | Age: 21
End: 2021-06-01

## 2021-07-27 ENCOUNTER — TRANSFERRED RECORDS (OUTPATIENT)
Dept: HEALTH INFORMATION MANAGEMENT | Facility: CLINIC | Age: 21
End: 2021-07-27

## 2021-10-10 ENCOUNTER — HEALTH MAINTENANCE LETTER (OUTPATIENT)
Age: 21
End: 2021-10-10

## 2021-10-25 ENCOUNTER — TRANSFERRED RECORDS (OUTPATIENT)
Dept: HEALTH INFORMATION MANAGEMENT | Facility: CLINIC | Age: 21
End: 2021-10-25
Payer: COMMERCIAL

## 2022-01-25 ENCOUNTER — TRANSFERRED RECORDS (OUTPATIENT)
Dept: HEALTH INFORMATION MANAGEMENT | Facility: CLINIC | Age: 22
End: 2022-01-25
Payer: COMMERCIAL

## 2022-01-30 ENCOUNTER — HEALTH MAINTENANCE LETTER (OUTPATIENT)
Age: 22
End: 2022-01-30

## 2022-09-18 ENCOUNTER — HEALTH MAINTENANCE LETTER (OUTPATIENT)
Age: 22
End: 2022-09-18

## 2022-12-17 NOTE — TELEPHONE ENCOUNTER
I sent out mail to let patient knows that there is a letter waiting for him at the clinic.     Daniel Álvarez, CMA     No

## 2023-05-07 ENCOUNTER — HEALTH MAINTENANCE LETTER (OUTPATIENT)
Age: 23
End: 2023-05-07

## 2023-08-21 ENCOUNTER — OFFICE VISIT (OUTPATIENT)
Dept: URGENT CARE | Facility: URGENT CARE | Age: 23
End: 2023-08-21
Payer: COMMERCIAL

## 2023-08-21 ENCOUNTER — ANCILLARY PROCEDURE (OUTPATIENT)
Dept: GENERAL RADIOLOGY | Facility: CLINIC | Age: 23
End: 2023-08-21
Attending: PHYSICIAN ASSISTANT
Payer: COMMERCIAL

## 2023-08-21 VITALS
DIASTOLIC BLOOD PRESSURE: 88 MMHG | OXYGEN SATURATION: 99 % | TEMPERATURE: 99 F | SYSTOLIC BLOOD PRESSURE: 142 MMHG | BODY MASS INDEX: 25.11 KG/M2 | HEART RATE: 60 BPM | WEIGHT: 175 LBS

## 2023-08-21 DIAGNOSIS — M54.2 NECK PAIN ON LEFT SIDE: ICD-10-CM

## 2023-08-21 DIAGNOSIS — S16.1XXA STRAIN OF NECK MUSCLE, INITIAL ENCOUNTER: ICD-10-CM

## 2023-08-21 DIAGNOSIS — S16.1XXA STRAIN OF NECK MUSCLE, INITIAL ENCOUNTER: Primary | ICD-10-CM

## 2023-08-21 PROCEDURE — 99214 OFFICE O/P EST MOD 30 MIN: CPT | Performed by: PHYSICIAN ASSISTANT

## 2023-08-21 PROCEDURE — 72040 X-RAY EXAM NECK SPINE 2-3 VW: CPT | Mod: TC | Performed by: RADIOLOGY

## 2023-08-21 RX ORDER — CYCLOBENZAPRINE HCL 10 MG
10 TABLET ORAL 3 TIMES DAILY PRN
Qty: 21 TABLET | Refills: 0 | Status: SHIPPED | OUTPATIENT
Start: 2023-08-21

## 2023-08-21 RX ORDER — IBUPROFEN 800 MG/1
800 TABLET, FILM COATED ORAL EVERY 8 HOURS PRN
Qty: 30 TABLET | Refills: 0 | Status: SHIPPED | OUTPATIENT
Start: 2023-08-21 | End: 2024-08-20

## 2023-08-22 NOTE — PROGRESS NOTES
Assessment & Plan     Strain of neck muscle, initial encounter    Xray cervical Negative for acute findings, read by Jabier HUTCHINSON at time of visit.    Patient has neck strain with spasms  Advised to alternate ice and warm moist compresses  Motrin 800 mg for neck pain and tenderness  ROM Exercises    - XR Cervical Spine 2/3 Views; Future  - cyclobenzaprine (FLEXERIL) 10 MG tablet; Take 1 tablet (10 mg) by mouth 3 times daily as needed for muscle spasms    Neck pain on left side    Patient has neck spasms and tenderness left side neck  Alternate warm moist compresses  Patient given flexeril for muscle spasms and tightness of left side neck    Patient to consider PT if symptoms persist > 3 days  - XR Cervical Spine 2/3 Views; Future  - ibuprofen (ADVIL/MOTRIN) 800 MG tablet; Take 1 tablet (800 mg) by mouth every 8 hours as needed    Review of external notes as documented elsewhere in note     At today's visit with Radha Garcia , we discussed results, diagnosis, medications and formulated a plan.  We also discussed red flags for immediate return to clinic/ER, as well as indications for follow up with PCP if not improved in 3 days. Patient understood and agreed to plan. Radha Garcia was discharged with stable vitals and has no further questions.       No follow-ups on file.    Jabier Wesley, [facility], [place]  [facility] KA[facility]IL Palacios is a 22 year old, presenting for the following health issues:  Headache (Today ) and Neck Pain (Today )      HPI   Review of Systems   Constitutional, HEENT, cardiovascular, pulmonary, gi and gu systems are negative, except as otherwise noted.      Objective    BP (!) 142/88   Pulse 60   Temp 99  F (37.2  C) (Oral)   Wt 79.4 kg (175 lb)   SpO2 99%   BMI 25.11 kg/m    Body mass index is 25.11 kg/m .  Physical Exam   GENERAL: healthy, alert and no distress  EYES: Eyes grossly normal to inspection, PERRL and conjunctivae and sclerae  normal  HENT: ear canals and TM's normal, nose and mouth without ulcers or lesions  NECK: Positive for tenderness left side cervical neck with tenderness on palpation  MS: Positive for upper back tenderness, tightness of muscles  SKIN: no suspicious lesions or rashes  NEURO: Normal strength and tone, mentation intact and speech normal  PSYCH: mentation appears normal, affect normal/bright      Cervical xray Negative for acute findings, read by Jabier HUTCHINSON at time of visit.

## 2024-07-14 ENCOUNTER — HEALTH MAINTENANCE LETTER (OUTPATIENT)
Age: 24
End: 2024-07-14

## 2025-07-19 ENCOUNTER — HEALTH MAINTENANCE LETTER (OUTPATIENT)
Age: 25
End: 2025-07-19